# Patient Record
Sex: MALE | Race: WHITE | Employment: OTHER | URBAN - NONMETROPOLITAN AREA
[De-identification: names, ages, dates, MRNs, and addresses within clinical notes are randomized per-mention and may not be internally consistent; named-entity substitution may affect disease eponyms.]

---

## 2021-06-11 ENCOUNTER — TELEPHONE (OUTPATIENT)
Dept: FAMILY MEDICINE CLINIC | Age: 71
End: 2021-06-11

## 2021-06-11 NOTE — TELEPHONE ENCOUNTER
Please refer to note sent in pt's wife chart requesting orders for covid antibody and covid test for upcoming flight

## 2021-06-14 NOTE — TELEPHONE ENCOUNTER
I am sorry as much as I would like to help I have not actually seen the patient and because of this do not have an established relationship. I am I am unable to order a test for patient I have not seen yet. There are tests that are available at various pharmacies throughout the area and I would recommend that they check there. Also the turnaround time for the test of the local pharmacies is likely to be more consistent with their needs further travel.

## 2021-07-16 ENCOUNTER — OFFICE VISIT (OUTPATIENT)
Dept: FAMILY MEDICINE CLINIC | Age: 71
End: 2021-07-16
Payer: COMMERCIAL

## 2021-07-16 VITALS
WEIGHT: 159 LBS | HEART RATE: 59 BPM | SYSTOLIC BLOOD PRESSURE: 110 MMHG | HEIGHT: 70 IN | OXYGEN SATURATION: 98 % | RESPIRATION RATE: 20 BRPM | BODY MASS INDEX: 22.76 KG/M2 | DIASTOLIC BLOOD PRESSURE: 82 MMHG

## 2021-07-16 DIAGNOSIS — Z51.81 MEDICATION MONITORING ENCOUNTER: ICD-10-CM

## 2021-07-16 DIAGNOSIS — Z13.220 SCREENING, LIPID: ICD-10-CM

## 2021-07-16 DIAGNOSIS — Z11.4 ENCOUNTER FOR SCREENING FOR HIV: ICD-10-CM

## 2021-07-16 DIAGNOSIS — Z13.1 SCREENING FOR DIABETES MELLITUS: ICD-10-CM

## 2021-07-16 DIAGNOSIS — R25.2 LEG CRAMP: ICD-10-CM

## 2021-07-16 DIAGNOSIS — Z23 NEED FOR DIPHTHERIA-TETANUS-PERTUSSIS (TDAP) VACCINE: ICD-10-CM

## 2021-07-16 DIAGNOSIS — Z12.11 SCREEN FOR COLON CANCER: ICD-10-CM

## 2021-07-16 DIAGNOSIS — Z00.00 WELL ADULT EXAM: Primary | ICD-10-CM

## 2021-07-16 DIAGNOSIS — Z11.59 NEED FOR HEPATITIS C SCREENING TEST: ICD-10-CM

## 2021-07-16 DIAGNOSIS — Z12.5 SCREENING FOR MALIGNANT NEOPLASM OF PROSTATE: ICD-10-CM

## 2021-07-16 PROBLEM — E78.00 PURE HYPERCHOLESTEROLEMIA: Status: ACTIVE | Noted: 2018-01-24

## 2021-07-16 PROCEDURE — 99387 INIT PM E/M NEW PAT 65+ YRS: CPT | Performed by: FAMILY MEDICINE

## 2021-07-16 PROCEDURE — 90715 TDAP VACCINE 7 YRS/> IM: CPT | Performed by: FAMILY MEDICINE

## 2021-07-16 PROCEDURE — 90471 IMMUNIZATION ADMIN: CPT | Performed by: FAMILY MEDICINE

## 2021-07-16 RX ORDER — GINGER ROOT
POWDER (GRAM) MISCELLANEOUS
COMMUNITY

## 2021-07-16 RX ORDER — ALUMINUM HYDROXIDE, MAGNESIUM HYDROXIDE, DIMETHICONE 400; 400; 40 MG/5ML; MG/5ML; MG/5ML
1 LIQUID ORAL
COMMUNITY

## 2021-07-16 RX ORDER — MULTIVITAMIN WITH IRON
TABLET ORAL
COMMUNITY
End: 2022-02-18

## 2021-07-16 RX ORDER — CHLORAL HYDRATE 500 MG
3000 CAPSULE ORAL 3 TIMES DAILY
COMMUNITY

## 2021-07-16 SDOH — ECONOMIC STABILITY: FOOD INSECURITY: WITHIN THE PAST 12 MONTHS, THE FOOD YOU BOUGHT JUST DIDN'T LAST AND YOU DIDN'T HAVE MONEY TO GET MORE.: NEVER TRUE

## 2021-07-16 SDOH — ECONOMIC STABILITY: FOOD INSECURITY: WITHIN THE PAST 12 MONTHS, YOU WORRIED THAT YOUR FOOD WOULD RUN OUT BEFORE YOU GOT MONEY TO BUY MORE.: NEVER TRUE

## 2021-07-16 ASSESSMENT — PATIENT HEALTH QUESTIONNAIRE - PHQ9
SUM OF ALL RESPONSES TO PHQ QUESTIONS 1-9: 0
SUM OF ALL RESPONSES TO PHQ9 QUESTIONS 1 & 2: 0
2. FEELING DOWN, DEPRESSED OR HOPELESS: 0
1. LITTLE INTEREST OR PLEASURE IN DOING THINGS: 0

## 2021-07-16 ASSESSMENT — SOCIAL DETERMINANTS OF HEALTH (SDOH): HOW HARD IS IT FOR YOU TO PAY FOR THE VERY BASICS LIKE FOOD, HOUSING, MEDICAL CARE, AND HEATING?: NOT HARD AT ALL

## 2021-07-16 NOTE — PROGRESS NOTES
[address] [facility]  [address]  Dept: [phone]  Dept X:[phone]    Riaz Crawley is a 72 y.o. male who presents today for his medical conditions/complaints as notedbelow. 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  Does try to keep his weight down has tried multiple different types of diets in the past.  Usually successful. No significant lab abnormalities in the past although cholesterol does improve when he is on a better diet. No real significant past medical history. Does have some occasional leg cramps. Wakes him up out of the bed on a regular basis but not nightly.       BP Readings from Last 3 Encounters:   07/16/21 110/82          (goal 120/80)    Wt Readings from Last 3 Encounters:   07/16/21 159 lb (72.1 kg)        Past Medical History:   Diagnosis Date    Non-recurrent unilateral inguinal hernia without obstruction or gangrene       Past Surgical History:   Procedure Laterality Date    INGUINAL HERNIA REPAIR         Family History   Problem Relation Age of Onset    Cancer Mother         Leukemia- 26    Stroke Father 79        tobacco    No Known Problems Son     No Known Problems Daughter        Social History     Tobacco Use    Smoking status: Never Smoker    Smokeless tobacco: Never Used   Substance Use Topics    Alcohol use: Never      Current Outpatient Medications   Medication Sig Dispense Refill    Lactobacillus Rhamnosus, GG, (RA PROBIOTIC DIGESTIVE CARE) CAPS Take 1 capsule by mouth      Cholecalciferol 50 MCG (2000 UT) TABS Take 2,000 Units by mouth      GLUCOSAMINE-CHONDROIT-BIOFL-MN PO Take by mouth      Omega-3 Fatty Acids (FISH OIL) 1000 MG CAPS Take 3,000 mg by mouth 3 times daily      Vitamin A 2400 MCG (8000 UT) CAPS Take by mouth      Turmeric (QC TUMERIC COMPLEX PO) Take by mouth      Padmini Root POWD by Does not apply route       No current facility-administered medications for this visit. No Known Allergies    Health Maintenance   Topic Date Due    Hepatitis C screen  Never done    COVID-19 Vaccine (1) Never done    Colon cancer screen colonoscopy  Never done    Shingles Vaccine (1 of 2) Never done    Pneumococcal 65+ years Vaccine (1 of 1 - PPSV23) Never done    Lipid screen  01/19/2021    Flu vaccine (1) 09/01/2021    DTaP/Tdap/Td vaccine (3 - Td or Tdap) 07/16/2031    Hepatitis A vaccine  Aged Out    Hepatitis B vaccine  Aged Out    Hib vaccine  Aged Out    Meningococcal (ACWY) vaccine  Aged Out       Subjective:      Review of Systems    Objective:     /82 (Site: Left Upper Arm, Position: Sitting, Cuff Size: Large Adult)   Pulse 59   Resp 20   Ht 5' 10\" (1.778 m)   Wt 159 lb (72.1 kg)   SpO2 98%   BMI 22.81 kg/m²     Physical Exam  Vitals and nursing note reviewed. Constitutional:       Appearance: Normal appearance. He is well-developed. HENT:      Head: Normocephalic and atraumatic. Eyes:      Conjunctiva/sclera: Conjunctivae normal.   Neck:      Thyroid: No thyromegaly. Vascular: No JVD. Cardiovascular:      Rate and Rhythm: Normal rate and regular rhythm. Pulses: Normal pulses. Heart sounds: Normal heart sounds. No murmur heard. No friction rub. No gallop. Pulmonary:      Effort: Pulmonary effort is normal. No respiratory distress. Breath sounds: Normal breath sounds. Abdominal:      Palpations: Abdomen is soft. There is no mass. Tenderness: There is no abdominal tenderness. Musculoskeletal:      Cervical back: Normal range of motion and neck supple. Lymphadenopathy:      Cervical: No cervical adenopathy. Skin:     General: Skin is warm. Capillary Refill: Capillary refill takes less than 2 seconds. Neurological:      General: No focal deficit present. Mental Status: He is alert and oriented to person, place, and time. note that portions of this note were completed with a voice-recognition program. Efforts were made to edit the dictation but occasionally words are mis-transcribed.)    Electronically signed by Cristy Collins MD on 7/18/2021

## 2021-07-23 LAB
ALBUMIN/GLOBULIN RATIO: 1.55 G/DL
ALBUMIN: 4.5 G/DL (ref 3.5–5)
ALP BLD-CCNC: 61 UNITS/L (ref 38–126)
ALT SERPL-CCNC: 52 UNITS/L (ref 4–35)
ANION GAP SERPL CALCULATED.3IONS-SCNC: 11.5 MMOL/L
AST SERPL-CCNC: 135 UNITS/L (ref 14–36)
BASOPHILS %: 1.64 (ref 0–3)
BASOPHILS ABSOLUTE: 0.09 (ref 0–0.3)
BILIRUB SERPL-MCNC: 0.9 MG/DL (ref 0.2–1.3)
BUN BLDV-MCNC: 20 MG/DL (ref 7–17)
CALCIUM SERPL-MCNC: 9.6 MG/DL (ref 8.4–10.2)
CHLORIDE BLD-SCNC: 105 MMOL/L (ref 98–120)
CHOLESTEROL/HDL RATIO: 2.86 RATIO (ref 0–4.5)
CHOLESTEROL: 189 MG/DL (ref 50–200)
CO2: 28 MMOL/L (ref 22–31)
CREAT SERPL-MCNC: 0.9 MG/DL (ref 0.5–1)
DIAGNOSTIC PSA: 0.93 NG/ML
EOSINOPHILS %: 0.97 (ref 0–10)
EOSINOPHILS ABSOLUTE: 0.06 (ref 0–1.1)
FERRITIN: 84.7 NG/DL (ref 10–282)
GFR CALCULATED: > 60
GLOBULIN: 2.9 G/DL
GLUCOSE: 86 MG/DL (ref 65–105)
HCT VFR BLD CALC: 43.6 % (ref 37–47)
HDLC SERPL-MCNC: 66 MG/DL (ref 36–68)
HEMOGLOBIN: 14.6 (ref 12–16)
HEPATITIS C ANTIBODY: NONREACTIVE
HIV AG/AB: NONREACTIVE
LDL CHOLESTEROL CALCULATED: 110.2 MG/DL (ref 0–160)
LYMPHOCYTE %: 22.05 (ref 20–51.1)
LYMPHOCYTES ABSOLUTE: 1.26 (ref 1–5.5)
MAGNESIUM: 2.1 MG/DL (ref 1.6–2.3)
MCH RBC QN AUTO: 30.9 PG (ref 28.5–32.5)
MCHC RBC AUTO-ENTMCNC: 33.4 G/DL (ref 32–37)
MCV RBC AUTO: 92.6 FL (ref 80–94)
MONOCYTES %: 9.94 (ref 1.7–9.3)
MONOCYTES ABSOLUTE: 0.57 (ref 0.1–1)
NEUTROPHILS %: 65.4 (ref 42.2–75.2)
NEUTROPHILS ABSOLUTE: 3.73 (ref 2–8.1)
PDW BLD-RTO: 11.6 % (ref 8.5–15.5)
PLATELET # BLD: 291.1 THOU/MM3 (ref 130–400)
POTASSIUM SERPL-SCNC: 4.5 MMOL/L (ref 3.6–5)
RBC: 4.71 M/UL (ref 4.2–5.4)
SODIUM BLD-SCNC: 140 MMOL/L (ref 135–145)
TOTAL PROTEIN, SERUM: 7.4 G/DL (ref 6.3–8.2)
TRIGL SERPL-MCNC: 64 MG/DL (ref 10–250)
VLDLC SERPL CALC-MCNC: 12.8 MG/DL (ref 0–50)
WBC: 5.7 THOU/ML3 (ref 4.8–10.8)

## 2021-07-26 DIAGNOSIS — R79.89 ABNORMAL LFTS: Primary | ICD-10-CM

## 2021-07-26 NOTE — RESULT ENCOUNTER NOTE
Please let patient know his labs were all fine with the exception of his liver tests being elevated. Many causes are possible of this. Vit A can be toxic to the liver -- please stop. Ziggy has mixed evidence, some show protection and other damage. I would recommend also holding this at this time. May continue the matthias root, lactobacillus, Vit D, omega 3 and glucosamine/ chondroitin but in no more than usual recommended doses. I would like a recheck on these levels in 2 months with the liver function tests. Orders put in the chart. Nonfasting.

## 2021-07-28 DIAGNOSIS — Z12.11 SCREEN FOR COLON CANCER: ICD-10-CM

## 2021-08-09 ENCOUNTER — TELEPHONE (OUTPATIENT)
Dept: FAMILY MEDICINE CLINIC | Age: 71
End: 2021-08-09

## 2021-08-09 NOTE — TELEPHONE ENCOUNTER
Patient returned call. Believed it was about cologuard results. Writer gave him the negative result. Patient is also wondering when Souleymane would like his blood work checked again. Patient stated he had some liver issues due to taking too much vitamin A and believes that Souleymane wanted it checked again.

## 2022-02-18 ENCOUNTER — OFFICE VISIT (OUTPATIENT)
Dept: FAMILY MEDICINE CLINIC | Age: 72
End: 2022-02-18
Payer: COMMERCIAL

## 2022-02-18 VITALS
DIASTOLIC BLOOD PRESSURE: 78 MMHG | WEIGHT: 168 LBS | HEART RATE: 66 BPM | BODY MASS INDEX: 24.11 KG/M2 | SYSTOLIC BLOOD PRESSURE: 122 MMHG

## 2022-02-18 DIAGNOSIS — Z12.5 SCREENING FOR PROSTATE CANCER: ICD-10-CM

## 2022-02-18 DIAGNOSIS — Z13.1 SCREENING FOR DIABETES MELLITUS: ICD-10-CM

## 2022-02-18 DIAGNOSIS — Z00.00 WELL ADULT EXAM: Primary | ICD-10-CM

## 2022-02-18 DIAGNOSIS — R79.89 ABNORMAL LFTS: ICD-10-CM

## 2022-02-18 DIAGNOSIS — Z13.220 SCREENING, LIPID: ICD-10-CM

## 2022-02-18 LAB
ALBUMIN/GLOBULIN RATIO: 1.4 G/DL
ALBUMIN: 4.2 G/DL (ref 3.5–5)
ALP BLD-CCNC: 54 UNITS/L (ref 38–126)
ALT SERPL-CCNC: 15 UNITS/L (ref 4–50)
ANION GAP SERPL CALCULATED.3IONS-SCNC: 6.9 MMOL/L
AST SERPL-CCNC: 27 UNITS/L (ref 17–59)
BASOPHILS %: 1.79 (ref 0–3)
BASOPHILS ABSOLUTE: 0.08 (ref 0–0.3)
BILIRUB SERPL-MCNC: 0.8 MG/DL (ref 0.2–1.3)
BUN BLDV-MCNC: 12 MG/DL (ref 9–20)
CALCIUM SERPL-MCNC: 9 MG/DL (ref 8.4–10.2)
CHLORIDE BLD-SCNC: 105 MMOL/L (ref 98–120)
CHOLESTEROL/HDL RATIO: 3 RATIO (ref 0–4.5)
CHOLESTEROL: 207 MG/DL (ref 50–200)
CO2: 25 MMOL/L (ref 22–31)
CREAT SERPL-MCNC: 0.7 MG/DL (ref 0.7–1.3)
DIAGNOSTIC PSA: 1.53 NG/ML (ref 0–4)
EOSINOPHILS %: 0.92 (ref 0–10)
EOSINOPHILS ABSOLUTE: 0.04 (ref 0–1.1)
GFR CALCULATED: > 60
GLOBULIN: 3 G/DL
GLUCOSE: 88 MG/DL (ref 75–110)
HCT VFR BLD CALC: 45.5 % (ref 42–52)
HDLC SERPL-MCNC: 69 MG/DL (ref 36–68)
HEMOGLOBIN: 15.2 (ref 13.8–17.8)
LDL CHOLESTEROL CALCULATED: 121.4 MG/DL (ref 0–160)
LYMPHOCYTE %: 32.28 (ref 20–51.1)
LYMPHOCYTES ABSOLUTE: 1.5 (ref 1–5.5)
MCH RBC QN AUTO: 31.5 PG (ref 28.5–32.5)
MCHC RBC AUTO-ENTMCNC: 33.4 G/DL (ref 32–37)
MCV RBC AUTO: 94.5 FL (ref 80–94)
MONOCYTES %: 9.86 (ref 1.7–9.3)
MONOCYTES ABSOLUTE: 0.46 (ref 0.1–1)
NEUTROPHILS %: 55.16 (ref 42.2–75.2)
NEUTROPHILS ABSOLUTE: 2.56 (ref 2–8.1)
PDW BLD-RTO: 11.9 % (ref 10–15.5)
PLATELET # BLD: 283.6 THOU/MM3 (ref 130–400)
POTASSIUM SERPL-SCNC: 4.5 MMOL/L (ref 3.6–5)
RBC: 4.82 M/UL (ref 4.7–6.1)
SODIUM BLD-SCNC: 137 MMOL/L (ref 135–145)
TOTAL PROTEIN, SERUM: 7.2 G/DL (ref 6.3–8.2)
TRIGL SERPL-MCNC: 83 MG/DL (ref 10–250)
VLDLC SERPL CALC-MCNC: 17 MG/DL (ref 0–50)
WBC: 4.6 THOU/ML3 (ref 4.8–10.8)

## 2022-02-18 PROCEDURE — 99397 PER PM REEVAL EST PAT 65+ YR: CPT | Performed by: FAMILY MEDICINE

## 2022-02-18 ASSESSMENT — PATIENT HEALTH QUESTIONNAIRE - PHQ9
SUM OF ALL RESPONSES TO PHQ9 QUESTIONS 1 & 2: 0
SUM OF ALL RESPONSES TO PHQ QUESTIONS 1-9: 0
2. FEELING DOWN, DEPRESSED OR HOPELESS: 0
SUM OF ALL RESPONSES TO PHQ QUESTIONS 1-9: 0
SUM OF ALL RESPONSES TO PHQ QUESTIONS 1-9: 0
1. LITTLE INTEREST OR PLEASURE IN DOING THINGS: 0
SUM OF ALL RESPONSES TO PHQ QUESTIONS 1-9: 0

## 2022-02-18 NOTE — PROGRESS NOTES
1200 [facility]  [address]  Dept: [phone]  Dept VRU:[phone]    Tawnya Melara is a 80 y.o. male who presents today for his medical conditions/complaints as notedbelow. Tawnya Melara is c/o of Annual Exam      HPI:     HPI    Is staying active and still working,  Feels good. Did stop the Vit A and still has the other supplements. Had elevated LFT's and concern this was related to his supplement use. Has been screening for the dental and the eye exams regularly. BP Readings from Last 3 Encounters:   02/18/22 122/78   07/16/21 110/82          (goal 120/80)    Wt Readings from Last 3 Encounters:   02/18/22 168 lb (76.2 kg)   07/16/21 159 lb (72.1 kg)        Past Medical History:   Diagnosis Date    Non-recurrent unilateral inguinal hernia without obstruction or gangrene       Past Surgical History:   Procedure Laterality Date    INGUINAL HERNIA REPAIR         Family History   Problem Relation Age of Onset    Cancer Mother         Leukemia- 26    Stroke Father 79        tobacco    No Known Problems Son     No Known Problems Daughter        Social History     Tobacco Use    Smoking status: Never Smoker    Smokeless tobacco: Never Used   Substance Use Topics    Alcohol use: Never      Current Outpatient Medications   Medication Sig Dispense Refill    Lactobacillus Rhamnosus, GG, (RA PROBIOTIC DIGESTIVE CARE) CAPS Take 1 capsule by mouth      Cholecalciferol 50 MCG (2000 UT) TABS Take 2,000 Units by mouth      GLUCOSAMINE-CHONDROIT-BIOFL-MN PO Take by mouth      Omega-3 Fatty Acids (FISH OIL) 1000 MG CAPS Take 3,000 mg by mouth 3 times daily      Turmeric (QC TUMERIC COMPLEX PO) Take by mouth      Padmini Root POWD by Does not apply route       No current facility-administered medications for this visit.      No Known Allergies    Health Maintenance   Topic Date Due    COVID-19 Vaccine (1) Never done    Shingles Vaccine (1 of 2) Never done    Pneumococcal 65+ years Vaccine (1 of 1 - PPSV23) Never done    Flu vaccine (1) Never done    Depression Screen  02/18/2023    Colorectal Cancer Screen  07/22/2024    Lipid screen  02/18/2027    DTaP/Tdap/Td vaccine (3 - Td or Tdap) 07/16/2031    Hepatitis C screen  Completed    Hepatitis A vaccine  Aged Out    Hepatitis B vaccine  Aged Out    Hib vaccine  Aged Out    Meningococcal (ACWY) vaccine  Aged Out       Subjective:      Review of Systems    Objective:     /78 (Site: Left Upper Arm, Position: Sitting, Cuff Size: Medium Adult)   Pulse 66   Wt 168 lb (76.2 kg)   BMI 24.11 kg/m²     Physical Exam  Vitals and nursing note reviewed. Constitutional:       Appearance: Normal appearance. He is well-developed. HENT:      Head: Normocephalic and atraumatic. Mouth/Throat:      Mouth: Mucous membranes are moist.   Eyes:      Extraocular Movements: Extraocular movements intact. Conjunctiva/sclera: Conjunctivae normal.      Pupils: Pupils are equal, round, and reactive to light. Neck:      Thyroid: No thyromegaly. Vascular: No JVD. Cardiovascular:      Rate and Rhythm: Normal rate and regular rhythm. Heart sounds: Normal heart sounds. No murmur heard. No friction rub. No gallop. Pulmonary:      Effort: Pulmonary effort is normal. No respiratory distress. Breath sounds: Normal breath sounds. Abdominal:      Palpations: Abdomen is soft. There is no mass. Tenderness: There is no abdominal tenderness. Musculoskeletal:      Cervical back: Normal range of motion and neck supple. Right lower leg: No edema. Left lower leg: No edema. Lymphadenopathy:      Cervical: No cervical adenopathy. Skin:     General: Skin is warm. Neurological:      General: No focal deficit present. Mental Status: He is alert and oriented to person, place, and time.    Psychiatric:         Mood and Affect: Mood normal.         Behavior: Behavior normal.         Thought Content: Thought content normal.         Judgment: Judgment normal.         Assessment/Plan:     1. Well adult exam  2. Abnormal LFTs  -     Vitamin A; Future  3. Screening, lipid  4. Screening for diabetes mellitus  5. Screening for prostate cancer    Up-to-date for routine screening. Reviewed recommended immunizations based on age and risk factors which he declines at this time. Questions answered. Lab Results   Component Value Date    WBC 4.6 (L) 02/18/2022    HGB 15.2 02/18/2022    HCT 45.5 02/18/2022    .6 02/18/2022    CHOL 207 (H) 02/18/2022    TRIG 83 02/18/2022    HDL 69 (H) 02/18/2022    ALT 15 02/18/2022    AST 27 02/18/2022     02/18/2022    K 4.5 02/18/2022     02/18/2022    CREATININE 0.7 02/18/2022    BUN 12 02/18/2022    CO2 25 02/18/2022    PSA 0.55 01/19/2016       No follow-ups on file. Multiple labs and other testing may have been ordered which may not be completely evident from the above note due to system interface incompatibilities. Patient given educational materials - see patientinstructions. Discussed use, benefit, and side effects of prescribed medications. All patient questions answered. Pt voiced understanding. Reviewed health maintenance. Instructed to continue current medications, diet andexercise. Patient agreed with treatment plan. Follow up as directed.      (Please note that portions of this note were completed with a voice-recognition program. Efforts were made to edit the dictation but occasionally words are mis-transcribed.)    Electronically signed by Lizzette Delgado MD on 2/27/2022

## 2022-02-21 LAB
INTERPRETATION: NORMAL
RETINYL PALMITATE: <0.02
VITAMIN A: 0.52

## 2022-02-22 DIAGNOSIS — R79.89 ABNORMAL LFTS: ICD-10-CM

## 2022-07-05 ENCOUNTER — TELEPHONE (OUTPATIENT)
Dept: FAMILY MEDICINE CLINIC | Age: 72
End: 2022-07-05

## 2022-07-05 NOTE — TELEPHONE ENCOUNTER
Received call from [address] triage. Wife calling with concerns that  has been feeling fatigue for the past 8 weeks. Looking to do blood or an appointment on a Friday.

## 2022-07-05 NOTE — TELEPHONE ENCOUNTER
Spoke with wife, scheduled for next available Friday per patient request and let them know if need seen sooner to call

## 2022-07-22 ENCOUNTER — OFFICE VISIT (OUTPATIENT)
Dept: FAMILY MEDICINE CLINIC | Age: 72
End: 2022-07-22
Payer: COMMERCIAL

## 2022-07-22 VITALS
OXYGEN SATURATION: 97 % | HEART RATE: 68 BPM | BODY MASS INDEX: 21.52 KG/M2 | WEIGHT: 150 LBS | DIASTOLIC BLOOD PRESSURE: 64 MMHG | RESPIRATION RATE: 16 BRPM | SYSTOLIC BLOOD PRESSURE: 102 MMHG

## 2022-07-22 DIAGNOSIS — E78.00 PURE HYPERCHOLESTEROLEMIA: ICD-10-CM

## 2022-07-22 DIAGNOSIS — R53.82 CHRONIC FATIGUE: Primary | ICD-10-CM

## 2022-07-22 PROCEDURE — 99213 OFFICE O/P EST LOW 20 MIN: CPT | Performed by: FAMILY MEDICINE

## 2022-07-22 PROCEDURE — 1123F ACP DISCUSS/DSCN MKR DOCD: CPT | Performed by: FAMILY MEDICINE

## 2022-07-22 SDOH — ECONOMIC STABILITY: FOOD INSECURITY: WITHIN THE PAST 12 MONTHS, YOU WORRIED THAT YOUR FOOD WOULD RUN OUT BEFORE YOU GOT MONEY TO BUY MORE.: NEVER TRUE

## 2022-07-22 SDOH — ECONOMIC STABILITY: FOOD INSECURITY: WITHIN THE PAST 12 MONTHS, THE FOOD YOU BOUGHT JUST DIDN'T LAST AND YOU DIDN'T HAVE MONEY TO GET MORE.: NEVER TRUE

## 2022-07-22 ASSESSMENT — SOCIAL DETERMINANTS OF HEALTH (SDOH): HOW HARD IS IT FOR YOU TO PAY FOR THE VERY BASICS LIKE FOOD, HOUSING, MEDICAL CARE, AND HEATING?: NOT HARD AT ALL

## 2022-07-22 NOTE — PROGRESS NOTES
1200 [facility]  [address]  Dept: [phone]  Dept DQQ:[phone]    Leonarda Redmond is a 80 y.o. male who presents today for his medical conditions/complaints as notedbelow. Leonarda Redmond is c/o of Fatigue (Is also wanting to see what his test levels are )        Assessment/Plan:     1. Chronic fatigue  -     Testosterone; Future  2. Pure hypercholesterolemia    Discussed the pitfalls of testosterone supplementation. Did discuss we could test this but unless severely out of normal range would not recommend any supplementation. Will research into the other health questions that he had today and get back with him. In the interim recommended adequate nutrition and avoid excessive weight loss and continue regular exercise. Current supplements appear to be safe. No evidence of any other underlying health issues other than age-related changes. Lab Results   Component Value Date    WBC 4.6 (L) 02/18/2022    HGB 15.2 02/18/2022    HCT 45.5 02/18/2022    .6 02/18/2022    CHOL 207 (H) 02/18/2022    TRIG 83 02/18/2022    HDL 69 (H) 02/18/2022    ALT 15 02/18/2022    AST 27 02/18/2022     02/18/2022    K 4.5 02/18/2022     02/18/2022    CREATININE 0.7 02/18/2022    BUN 12 02/18/2022    CO2 25 02/18/2022    PSA 0.55 01/19/2016       Return in about 6 months (around 1/22/2023) for Well adult. Subjective:      HPI:     HPI    Lora Real has lost about 18 pounds. He has been exercising and trying to really eat healthfully. He does continue to use of glucosamine/chondroitin supplements the Lactobacillus omega-3's and turmeric. Also the vitamin D. And gingerroot. Just feels older at times. Not the get up and go. Walks quickly, wants to nap. If he gets a chance. If he sits he will sleep. Sleeps well at night. He denies chest pain shortness of breath or dyspnea. Denies heat or cold intolerance.   He has no other focal symptoms. No URI symptoms no changes in his bowels. He is up-to-date on all his routine health maintenance and surveillance. Has questions about several tests that can help him determine what is best foods for him to be eating. These are recommended by a food and nutrition group from [employer]. He provided the names today. Wonders if he can have these ordered in addition to the testosterone level to help him improve his health and to evaluate his fatigue. BP Readings from Last 3 Encounters:   07/22/22 102/64   02/18/22 122/78   07/16/21 110/82          (goal 120/80)    Wt Readings from Last 3 Encounters:   07/22/22 150 lb (68 kg)   02/18/22 168 lb (76.2 kg)   07/16/21 159 lb (72.1 kg)        Past Medical History:   Diagnosis Date    Non-recurrent unilateral inguinal hernia without obstruction or gangrene       Past Surgical History:   Procedure Laterality Date    INGUINAL HERNIA REPAIR         Family History   Problem Relation Age of Onset    Cancer Mother         Leukemia- 1968    Stroke Father 79        tobacco    No Known Problems Son     No Known Problems Daughter        Social History     Tobacco Use    Smoking status: Never    Smokeless tobacco: Never   Substance Use Topics    Alcohol use: Never      Current Outpatient Medications   Medication Sig Dispense Refill    Lactobacillus Rhamnosus, GG, (RA PROBIOTIC DIGESTIVE CARE) CAPS Take 1 capsule by mouth      Cholecalciferol 50 MCG (2000 UT) TABS Take 2,000 Units by mouth      GLUCOSAMINE-CHONDROIT-BIOFL-MN PO Take by mouth      Omega-3 Fatty Acids (FISH OIL) 1000 MG CAPS Take 3,000 mg by mouth 3 times daily      Turmeric (QC TUMERIC COMPLEX PO) Take by mouth      Padmini Root POWD by Does not apply route       No current facility-administered medications for this visit.      No Known Allergies    Health Maintenance   Topic Date Due    COVID-19 Vaccine (1) Never done    Shingles vaccine (1 of 2) Never done    Pneumococcal 65+ years Vaccine (1 - PCV) Never done    Flu vaccine (1) 09/01/2022    Depression Screen  02/18/2023    Colorectal Cancer Screen  07/22/2024    Lipids  02/18/2027    DTaP/Tdap/Td vaccine (3 - Td or Tdap) 07/16/2031    Hepatitis C screen  Completed    Hepatitis A vaccine  Aged Out    Hepatitis B vaccine  Aged Out    Hib vaccine  Aged Out    Meningococcal (ACWY) vaccine  Aged Out         Review of Systems    Objective:     /64 (Site: Left Upper Arm, Position: Sitting, Cuff Size: Medium Adult)   Pulse 68   Resp 16   Wt 150 lb (68 kg)   SpO2 97%   BMI 21.52 kg/m²     Physical Exam  Vitals and nursing note reviewed. Constitutional:       Appearance: Normal appearance. He is well-developed. HENT:      Head: Normocephalic and atraumatic. Right Ear: External ear normal.      Left Ear: External ear normal.   Eyes:      Extraocular Movements: Extraocular movements intact. Conjunctiva/sclera: Conjunctivae normal.   Neck:      Thyroid: No thyromegaly. Vascular: No JVD. Cardiovascular:      Rate and Rhythm: Normal rate and regular rhythm. Pulses: Normal pulses. Heart sounds: Normal heart sounds. No murmur heard. No friction rub. No gallop. Pulmonary:      Effort: Pulmonary effort is normal. No respiratory distress. Breath sounds: Normal breath sounds. Musculoskeletal:      Cervical back: Normal range of motion and neck supple. Right lower leg: No edema. Left lower leg: No edema. Lymphadenopathy:      Cervical: No cervical adenopathy. Skin:     General: Skin is warm. Capillary Refill: Capillary refill takes less than 2 seconds. Neurological:      General: No focal deficit present. Mental Status: He is alert and oriented to person, place, and time. Psychiatric:         Mood and Affect: Mood normal.         Behavior: Behavior normal.         Thought Content:  Thought content normal.         Judgment: Judgment normal.             Multiple labs and other testing may have been

## 2023-01-20 ENCOUNTER — OFFICE VISIT (OUTPATIENT)
Dept: FAMILY MEDICINE CLINIC | Age: 73
End: 2023-01-20
Payer: COMMERCIAL

## 2023-01-20 VITALS
OXYGEN SATURATION: 96 % | HEART RATE: 73 BPM | WEIGHT: 159.8 LBS | DIASTOLIC BLOOD PRESSURE: 52 MMHG | SYSTOLIC BLOOD PRESSURE: 120 MMHG | HEIGHT: 69 IN | BODY MASS INDEX: 23.67 KG/M2

## 2023-01-20 DIAGNOSIS — Z12.5 SCREENING FOR MALIGNANT NEOPLASM OF PROSTATE: ICD-10-CM

## 2023-01-20 DIAGNOSIS — Z00.00 WELL ADULT EXAM: Primary | ICD-10-CM

## 2023-01-20 DIAGNOSIS — E78.00 PURE HYPERCHOLESTEROLEMIA: ICD-10-CM

## 2023-01-20 DIAGNOSIS — R79.89 ABNORMAL LFTS: ICD-10-CM

## 2023-01-20 PROBLEM — B35.1 TINEA UNGUIUM: Status: ACTIVE | Noted: 2022-11-30

## 2023-01-20 PROCEDURE — 99397 PER PM REEVAL EST PAT 65+ YR: CPT | Performed by: FAMILY MEDICINE

## 2023-01-20 ASSESSMENT — PATIENT HEALTH QUESTIONNAIRE - PHQ9
2. FEELING DOWN, DEPRESSED OR HOPELESS: 0
SUM OF ALL RESPONSES TO PHQ QUESTIONS 1-9: 0
1. LITTLE INTEREST OR PLEASURE IN DOING THINGS: 0
SUM OF ALL RESPONSES TO PHQ QUESTIONS 1-9: 0
SUM OF ALL RESPONSES TO PHQ QUESTIONS 1-9: 0
SUM OF ALL RESPONSES TO PHQ9 QUESTIONS 1 & 2: 0
SUM OF ALL RESPONSES TO PHQ QUESTIONS 1-9: 0

## 2023-01-20 NOTE — PROGRESS NOTES
1200 [facility]  [address]  Dept: [phone]  Dept WPW:[phone]    Catrachita Carreno is a 71 y.o. male who presents today for his medical conditions/complaints as notedbelow. Catrachita Carreno is c/o of Annual Exam (Reports saw podiatrist in [place] for toenail fungus and has follow up in a couple months )      Assessment/Plan:     1. Well adult exam  -     Comprehensive Metabolic Panel; Future  -     PSA, Diagnostic; Future  -     Lipid Panel; Future  2. Pure hypercholesterolemia  -     Lipid Panel; Future  3. Abnormal LFTs  -     Comprehensive Metabolic Panel; Future  4. Screening for malignant neoplasm of prostate  -     PSA, Diagnostic; Future    Abnormal LFT's have resolved with cessation of the excessive vitamins. Continue to monitor Annually. Reviewed well adult topics and encouraged recommended vaccines. Cologuard due in 2024      Lab Results   Component Value Date    WBC 4.6 (L) 02/18/2022    HGB 15.2 02/18/2022    HCT 45.5 02/18/2022    .6 02/18/2022    CHOL 207 (H) 02/18/2022    TRIG 83 02/18/2022    HDL 69 (H) 02/18/2022    ALT 19 11/30/2022    AST 26 11/30/2022     02/18/2022    K 4.5 02/18/2022     02/18/2022    CREATININE 0.7 02/18/2022    BUN 12 02/18/2022    CO2 25 02/18/2022    PSA 0.55 01/19/2016       Return in about 1 year (around 1/20/2024) for Well adult. Subjective:      HPI:     HPI    Has been feeling well. No concerns. Continues to work. Exercises regularly. No other concerns. Trying to eat regularly.     BP Readings from Last 3 Encounters:   01/20/23 (!) 120/52   07/22/22 102/64   02/18/22 122/78          (goal 120/80)    Wt Readings from Last 3 Encounters:   01/20/23 159 lb 12.8 oz (72.5 kg)   07/22/22 150 lb (68 kg)   02/18/22 168 lb (76.2 kg)        Past Medical History:   Diagnosis Date    Non-recurrent unilateral inguinal hernia without obstruction or gangrene       Past Surgical History:   Procedure Laterality Date    INGUINAL HERNIA REPAIR         Family History   Problem Relation Age of Onset    Cancer Mother         Leukemia- 1968    Stroke Father 79        tobacco    No Known Problems Son     No Known Problems Daughter        Social History     Tobacco Use    Smoking status: Never    Smokeless tobacco: Never   Substance Use Topics    Alcohol use: Never      Current Outpatient Medications   Medication Sig Dispense Refill    Lactobacillus Rhamnosus, GG, (RA PROBIOTIC DIGESTIVE CARE) CAPS Take 1 capsule by mouth      Cholecalciferol 50 MCG (2000 UT) TABS Take 2,000 Units by mouth      GLUCOSAMINE-CHONDROIT-BIOFL-MN PO Take by mouth      Omega-3 Fatty Acids (FISH OIL) 1000 MG CAPS Take 3,000 mg by mouth 3 times daily      Turmeric (QC TUMERIC COMPLEX PO) Take by mouth      Padmini Root POWD by Does not apply route       No current facility-administered medications for this visit. No Known Allergies    Health Maintenance   Topic Date Due    COVID-19 Vaccine (1) Never done    Shingles vaccine (1 of 2) Never done    Pneumococcal 65+ years Vaccine (1 - PCV) Never done    Flu vaccine (1) Never done    Depression Screen  01/20/2024    Colorectal Cancer Screen  07/22/2024    Lipids  02/18/2027    DTaP/Tdap/Td vaccine (3 - Td or Tdap) 07/16/2031    Hepatitis C screen  Completed    Hepatitis A vaccine  Aged Out    Hib vaccine  Aged Out    Meningococcal (ACWY) vaccine  Aged Out         Review of Systems    Objective:     BP (!) 120/52   Pulse 73   Ht 5' 8.53\" (1.741 m)   Wt 159 lb 12.8 oz (72.5 kg)   SpO2 96%   BMI 23.92 kg/m²     Physical Exam  Vitals and nursing note reviewed. Constitutional:       Appearance: Normal appearance. He is well-developed. HENT:      Head: Normocephalic and atraumatic. Right Ear: External ear normal.      Left Ear: External ear normal.   Eyes:      Extraocular Movements: Extraocular movements intact.       Conjunctiva/sclera: Conjunctivae normal.   Neck: Thyroid: No thyromegaly. Vascular: No JVD. Cardiovascular:      Rate and Rhythm: Normal rate and regular rhythm. Pulses: Normal pulses. Heart sounds: Normal heart sounds. No murmur heard. No friction rub. No gallop. Pulmonary:      Effort: Pulmonary effort is normal. No respiratory distress. Breath sounds: Normal breath sounds. Abdominal:      Palpations: Abdomen is soft. There is no mass. Tenderness: There is no abdominal tenderness. Musculoskeletal:      Cervical back: Normal range of motion and neck supple. Right lower leg: No edema. Left lower leg: No edema. Lymphadenopathy:      Cervical: No cervical adenopathy. Skin:     General: Skin is warm. Capillary Refill: Capillary refill takes less than 2 seconds. Neurological:      General: No focal deficit present. Mental Status: He is alert and oriented to person, place, and time. Psychiatric:         Mood and Affect: Mood normal.         Behavior: Behavior normal.         Thought Content: Thought content normal.         Judgment: Judgment normal.             Multiple labs and other testing may have been ordered which may not be completely evident from the above note due to system interface incompatibilities. Patient given educational materials - see patientinstructions. Discussed use, benefit, and side effects of prescribed medications. All patient questions answered. Pt voiced understanding. Reviewed health maintenance. Instructed to continue current medications, diet andexercise. Patient agreed with treatment plan. Follow up as directed.      (Please note that portions of this note were completed with a voice-recognition program. Efforts were made to edit the dictation but occasionally words are mis-transcribed.)    Electronically signed by Grisel Badillo MD on 1/28/2023

## 2024-01-26 ENCOUNTER — OFFICE VISIT (OUTPATIENT)
Dept: FAMILY MEDICINE CLINIC | Age: 74
End: 2024-01-26

## 2024-01-26 VITALS
WEIGHT: 161 LBS | HEART RATE: 86 BPM | BODY MASS INDEX: 23.85 KG/M2 | DIASTOLIC BLOOD PRESSURE: 74 MMHG | SYSTOLIC BLOOD PRESSURE: 126 MMHG | HEIGHT: 69 IN | OXYGEN SATURATION: 97 %

## 2024-01-26 DIAGNOSIS — Z12.5 SCREENING FOR PROSTATE CANCER: ICD-10-CM

## 2024-01-26 DIAGNOSIS — Z12.11 SCREEN FOR COLON CANCER: ICD-10-CM

## 2024-01-26 DIAGNOSIS — Z13.220 SCREENING, LIPID: ICD-10-CM

## 2024-01-26 DIAGNOSIS — R25.2 LEG CRAMPS: ICD-10-CM

## 2024-01-26 DIAGNOSIS — Z00.00 WELL ADULT EXAM: Primary | ICD-10-CM

## 2024-01-26 DIAGNOSIS — R79.89 ELEVATED SERUM CREATININE: ICD-10-CM

## 2024-01-26 LAB
ALBUMIN/GLOBULIN RATIO: 1.4 G/DL
ALBUMIN: 4.2 G/DL (ref 3.5–5)
ALP BLD-CCNC: 63 UNITS/L (ref 38–126)
ALT SERPL-CCNC: 23 UNITS/L (ref 4–50)
ANION GAP SERPL CALCULATED.3IONS-SCNC: 5 MMOL/L (ref 12–16)
AST SERPL-CCNC: 34 UNITS/L (ref 17–59)
BASOPHILS %: 1.17 (ref 0–3)
BASOPHILS ABSOLUTE: 0.09 (ref 0–0.3)
BILIRUB SERPL-MCNC: 0.8 MG/DL (ref 0.2–1.3)
BUN BLDV-MCNC: 27 MG/DL (ref 9–20)
CALCIUM SERPL-MCNC: 9.4 MG/DL (ref 8.4–10.2)
CHLORIDE BLD-SCNC: 109 MMOL/L (ref 98–120)
CHOLESTEROL/HDL RATIO: 3 RATIO (ref 0–4.5)
CHOLESTEROL: 181 MG/DL (ref 50–200)
CO2: 23 MMOL/L (ref 22–31)
CREAT SERPL-MCNC: 2.2 MG/DL (ref 0.7–1.3)
DIAGNOSTIC PSA: 2.1 NG/ML (ref 0–4)
EOSINOPHILS %: 0.31 (ref 0–10)
EOSINOPHILS ABSOLUTE: 0.02 (ref 0–1.1)
FERRITIN: 57.9 NG/DL (ref 16–323)
GFR CALCULATED: 31.4
GLOBULIN: 2.9 G/DL
GLUCOSE: 81 MG/DL (ref 75–110)
HCT VFR BLD CALC: 50.7 % (ref 42–52)
HDLC SERPL-MCNC: 62 MG/DL (ref 36–68)
HEMOGLOBIN: 16.7 (ref 13.8–17.8)
LDL CHOLESTEROL CALCULATED: 103.6 MG/DL (ref 0–160)
LYMPHOCYTE %: 16.2 (ref 20–51.1)
LYMPHOCYTES ABSOLUTE: 1.19 (ref 1–5.5)
MAGNESIUM: 1.9 MG/DL (ref 1.6–2.3)
MCH RBC QN AUTO: 30.7 PG (ref 28.5–32.5)
MCHC RBC AUTO-ENTMCNC: 32.9 G/DL (ref 32–37)
MCV RBC AUTO: 93.2 FL (ref 80–94)
MONOCYTES %: 7.54 (ref 1.7–9.3)
MONOCYTES ABSOLUTE: 0.55 (ref 0.1–1)
NEUTROPHILS %: 74.78 (ref 42.2–75.2)
NEUTROPHILS ABSOLUTE: 5.51 (ref 2–8.1)
PDW BLD-RTO: 11.4 % (ref 10–15.5)
PLATELET # BLD: 275.2 THOU/MM3 (ref 130–400)
POTASSIUM SERPL-SCNC: 4.7 MMOL/L (ref 3.6–5)
RBC: 5.44 M/UL (ref 4.7–6.1)
SODIUM BLD-SCNC: 137 MMOL/L (ref 135–145)
TOTAL PROTEIN, SERUM: 7.1 G/DL (ref 6.3–8.2)
TRIGL SERPL-MCNC: 77 MG/DL (ref 10–250)
VITAMIN B-12: 692 PG/ML (ref 239–931)
VLDLC SERPL CALC-MCNC: 15 MG/DL (ref 0–50)
WBC: 7.4 THOU/ML3 (ref 4.8–10.8)

## 2024-01-26 SDOH — ECONOMIC STABILITY: FOOD INSECURITY: WITHIN THE PAST 12 MONTHS, THE FOOD YOU BOUGHT JUST DIDN'T LAST AND YOU DIDN'T HAVE MONEY TO GET MORE.: NEVER TRUE

## 2024-01-26 SDOH — ECONOMIC STABILITY: INCOME INSECURITY: HOW HARD IS IT FOR YOU TO PAY FOR THE VERY BASICS LIKE FOOD, HOUSING, MEDICAL CARE, AND HEATING?: NOT HARD AT ALL

## 2024-01-26 SDOH — ECONOMIC STABILITY: FOOD INSECURITY: WITHIN THE PAST 12 MONTHS, YOU WORRIED THAT YOUR FOOD WOULD RUN OUT BEFORE YOU GOT MONEY TO BUY MORE.: NEVER TRUE

## 2024-01-26 SDOH — ECONOMIC STABILITY: HOUSING INSECURITY
IN THE LAST 12 MONTHS, WAS THERE A TIME WHEN YOU DID NOT HAVE A STEADY PLACE TO SLEEP OR SLEPT IN A SHELTER (INCLUDING NOW)?: NO

## 2024-01-26 ASSESSMENT — PATIENT HEALTH QUESTIONNAIRE - PHQ9
2. FEELING DOWN, DEPRESSED OR HOPELESS: 0
SUM OF ALL RESPONSES TO PHQ QUESTIONS 1-9: 0
SUM OF ALL RESPONSES TO PHQ QUESTIONS 1-9: 0
1. LITTLE INTEREST OR PLEASURE IN DOING THINGS: 0
SUM OF ALL RESPONSES TO PHQ QUESTIONS 1-9: 0
SUM OF ALL RESPONSES TO PHQ9 QUESTIONS 1 & 2: 0
SUM OF ALL RESPONSES TO PHQ QUESTIONS 1-9: 0

## 2024-01-26 NOTE — PROGRESS NOTES
(goal 120/80)    Wt Readings from Last 3 Encounters:   01/26/24 73 kg (161 lb)   01/20/23 72.5 kg (159 lb 12.8 oz)   07/22/22 68 kg (150 lb)        Past Medical History:   Diagnosis Date    Non-recurrent unilateral inguinal hernia without obstruction or gangrene       Past Surgical History:   Procedure Laterality Date    INGUINAL HERNIA REPAIR         Family History   Problem Relation Age of Onset    Cancer Mother         Leukemia- 1968    Stroke Father 70        tobacco    No Known Problems Son     No Known Problems Daughter        Social History     Tobacco Use    Smoking status: Never    Smokeless tobacco: Never   Substance Use Topics    Alcohol use: Never      Current Outpatient Medications   Medication Sig Dispense Refill    Lactobacillus Rhamnosus, GG, (RA PROBIOTIC DIGESTIVE CARE) CAPS Take 1 capsule by mouth      Cholecalciferol 50 MCG (2000 UT) TABS Take 1 tablet by mouth      GLUCOSAMINE-CHONDROIT-BIOFL-MN PO Take by mouth      Omega-3 Fatty Acids (FISH OIL) 1000 MG CAPS Take 3 capsules by mouth 3 times daily      Turmeric (QC TUMERIC COMPLEX PO) Take by mouth      Padmini Root POWD by Does not apply route       No current facility-administered medications for this visit.     No Known Allergies    Health Maintenance   Topic Date Due    COVID-19 Vaccine (1) Never done    Shingles vaccine (1 of 2) Never done    Respiratory Syncytial Virus (RSV) Pregnant or age 60 yrs+ (1 - 1-dose 60+ series) Never done    Pneumococcal 65+ years Vaccine (1 - PCV) Never done    Flu vaccine (1) Never done    Colorectal Cancer Screen  07/22/2024    Depression Screen  01/26/2025    GFR test (Diabetes, CKD 3-4, OR last GFR 15-59)  01/26/2025    Lipids  01/26/2029    DTaP/Tdap/Td vaccine (3 - Td or Tdap) 07/16/2031    Hepatitis C screen  Completed    Hepatitis A vaccine  Aged Out    Hepatitis B vaccine  Aged Out    Hib vaccine  Aged Out    Polio vaccine  Aged Out    Meningococcal (ACWY) vaccine  Aged Out         Review of

## 2024-03-01 LAB
ANION GAP SERPL CALCULATED.3IONS-SCNC: 1.4 MMOL/L (ref 3–11)
BUN BLDV-MCNC: 19 MG/DL (ref 9–20)
CALCIUM SERPL-MCNC: 9.5 MG/DL (ref 8.4–10.2)
CHLORIDE BLD-SCNC: 106 MMOL/L (ref 98–120)
CO2: 30 MMOL/L (ref 22–31)
CREAT SERPL-MCNC: 0.8 MG/DL (ref 0.7–1.3)
GFR CALCULATED: > 60
GLUCOSE: 105 MG/DL (ref 75–110)
POTASSIUM SERPL-SCNC: 4.8 MMOL/L (ref 3.6–5)
SODIUM BLD-SCNC: 137 MMOL/L (ref 135–145)

## 2024-05-10 ENCOUNTER — OFFICE VISIT (OUTPATIENT)
Dept: FAMILY MEDICINE CLINIC | Age: 74
End: 2024-05-10
Payer: COMMERCIAL

## 2024-05-10 VITALS
BODY MASS INDEX: 24.25 KG/M2 | OXYGEN SATURATION: 96 % | DIASTOLIC BLOOD PRESSURE: 82 MMHG | SYSTOLIC BLOOD PRESSURE: 122 MMHG | WEIGHT: 162 LBS | HEART RATE: 77 BPM

## 2024-05-10 DIAGNOSIS — R94.31 ABNORMAL ELECTROCARDIOGRAPHY: ICD-10-CM

## 2024-05-10 DIAGNOSIS — I48.19 PERSISTENT ATRIAL FIBRILLATION (HCC): Primary | ICD-10-CM

## 2024-05-10 DIAGNOSIS — I48.20 CHRONIC ATRIAL FIBRILLATION (HCC): ICD-10-CM

## 2024-05-10 DIAGNOSIS — R94.31 ABNORMAL EKG: ICD-10-CM

## 2024-05-10 PROBLEM — B35.1 TINEA UNGUIUM: Status: RESOLVED | Noted: 2022-11-30 | Resolved: 2024-05-10

## 2024-05-10 PROBLEM — E78.00 PURE HYPERCHOLESTEROLEMIA: Status: RESOLVED | Noted: 2018-01-24 | Resolved: 2024-05-10

## 2024-05-10 PROCEDURE — 99214 OFFICE O/P EST MOD 30 MIN: CPT | Performed by: FAMILY MEDICINE

## 2024-05-10 PROCEDURE — 1123F ACP DISCUSS/DSCN MKR DOCD: CPT | Performed by: FAMILY MEDICINE

## 2024-05-10 PROCEDURE — 93000 ELECTROCARDIOGRAM COMPLETE: CPT | Performed by: FAMILY MEDICINE

## 2024-05-10 NOTE — PROGRESS NOTES
Conjunctiva/sclera: Conjunctivae normal.   Neck:      Thyroid: No thyromegaly.      Vascular: No JVD.   Cardiovascular:      Rate and Rhythm: Normal rate. Rhythm irregular.      Pulses: Normal pulses.      Heart sounds: Normal heart sounds. No murmur heard.     No friction rub. No gallop.      Comments: Rhythm is regular to auscultation but clearly demonstrates atrial fibrillation on EKG.  Pulmonary:      Effort: Pulmonary effort is normal. No respiratory distress.      Breath sounds: Normal breath sounds.   Musculoskeletal:      Cervical back: Normal range of motion and neck supple.      Right lower leg: No edema.      Left lower leg: No edema.   Lymphadenopathy:      Cervical: No cervical adenopathy.   Skin:     General: Skin is warm.      Capillary Refill: Capillary refill takes less than 2 seconds.   Neurological:      General: No focal deficit present.      Mental Status: He is alert and oriented to person, place, and time.   Psychiatric:         Mood and Affect: Mood normal.         Behavior: Behavior normal.         Thought Content: Thought content normal.         Judgment: Judgment normal.               Multiple labs and other testing may have been ordered which may not be completely evident from the above note due to system interface incompatibilities.     Patient given educational materials - see patientinstructions.  Discussed use, benefit, and side effects of prescribed medications.  All patient questions answered.  Pt voiced understanding. Reviewed health maintenance.  Instructed to continue current medications, diet andexercise.  Patient agreed with treatment plan. Follow up as directed.     (Please note that portions of this note were completed with a voice-recognition program. Efforts were made to edit the dictation but occasionally words are mis-transcribed.)    Electronically signed by Patsy Comer MD on 5/14/2024

## 2024-05-13 LAB
BASOPHILS ABSOLUTE: 0.08 (ref 0–0.3)
BASOPHILS RELATIVE PERCENT: 1.14 (ref 0–3)
EOSINOPHILS ABSOLUTE: 0.04 (ref 0–1.1)
EOSINOPHILS RELATIVE PERCENT: 0.62 (ref 0–10)
HCT VFR BLD CALC: 46.5 % (ref 42–52)
HEMOGLOBIN: 15.9 (ref 13.8–17.8)
LYMPHOCYTES ABSOLUTE: 1.67 (ref 1–5.5)
LYMPHOCYTES RELATIVE PERCENT: 24.27 (ref 20–51.1)
MAGNESIUM: 2 MG/DL (ref 1.6–2.3)
MCH RBC QN AUTO: 31.6 PG (ref 28.5–32.5)
MCHC RBC AUTO-ENTMCNC: 34.1 G/DL (ref 32–37)
MCV RBC AUTO: 92.8 FL (ref 80–94)
MONOCYTES ABSOLUTE: 0.54 (ref 0.1–1)
MONOCYTES RELATIVE PERCENT: 7.93 (ref 1.7–9.3)
NEUTROPHILS ABSOLUTE: 4.53 (ref 2–8.1)
NEUTROPHILS RELATIVE PERCENT: 66.05 (ref 42.2–75.2)
PDW BLD-RTO: 11.5 % (ref 10–15.5)
PLATELET # BLD: 249.9 THOU/MM3 (ref 130–400)
RBC # BLD: 5.02 M/UL (ref 4.7–6.1)
TSH REFLEX FT4: 1.91 MIU/ML (ref 0.49–4.67)
WBC # BLD: 6.9 THOU/ML3 (ref 4.8–10.8)

## 2024-05-23 DIAGNOSIS — I48.20 CHRONIC ATRIAL FIBRILLATION (HCC): ICD-10-CM

## 2024-05-31 ENCOUNTER — TELEPHONE (OUTPATIENT)
Dept: FAMILY MEDICINE CLINIC | Age: 74
End: 2024-05-31

## 2024-05-31 NOTE — TELEPHONE ENCOUNTER
Stress test does show an area of anterior ischemia.  Could be concerning for a blockage.  Results reviewed with patient.  He has an appointment with cardiology already established for next Friday.  If any new or unusual symptoms in the interim he will let us know immediately.  No changes to medications in the interim.

## 2024-06-08 ENCOUNTER — TELEPHONE (OUTPATIENT)
Dept: FAMILY MEDICINE CLINIC | Age: 74
End: 2024-06-08

## 2024-06-08 NOTE — TELEPHONE ENCOUNTER
Patient saw Dr. Apodaca- [facility] cardiology. Based on his note they apparantly did not have the stress test results at the time of the visit.  The cardiolyte scan showed intermediate risk scan due to area of moderate anterior ischemia.  Want to be sure they are aware of this and that their recommendations do not change in light of that.  Please also fac them the results.

## 2024-06-10 DIAGNOSIS — I48.19 PERSISTENT ATRIAL FIBRILLATION (HCC): ICD-10-CM

## 2024-06-10 DIAGNOSIS — R94.31 ABNORMAL ELECTROCARDIOGRAPHY: ICD-10-CM

## 2024-06-10 DIAGNOSIS — R94.31 ABNORMAL EKG: ICD-10-CM

## 2024-06-17 ENCOUNTER — PATIENT MESSAGE (OUTPATIENT)
Dept: FAMILY MEDICINE CLINIC | Age: 74
End: 2024-06-17

## 2024-06-17 DIAGNOSIS — I48.19 PERSISTENT ATRIAL FIBRILLATION (HCC): ICD-10-CM

## 2024-06-18 DIAGNOSIS — I48.19 PERSISTENT ATRIAL FIBRILLATION (HCC): ICD-10-CM

## 2024-06-18 NOTE — TELEPHONE ENCOUNTER
From: Wolfgang Naylor  To: Dr. Patsy Comer  Sent: 6/17/2024 7:38 PM EDT  Subject: Changing direction of treatment    Dr. Comer,    I have spent the past month thinking about the recent events related to my heart. The initial event was related to a very stressful event that triggered my A fib. 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 I feel that these events are the major contributing factors to the results of all the tests we have run. I am asking for your consent to postpone all the upcoming medical procedures. I feel certain that when my life returns to a more even keel and the stress and healing rib are over, I will be happy to re run the tests and proceed with treatment as needed. The Echocardiogram was done with me lying on my left side in pain on my cracked rib. I am fine with continuing the Blood thinner as you prescribed.   Speaking of my Eliquis prescription, I am out and need a refill.   I think my appointment for follow-up on my initial EKG is a bit premature due to the continued stress I am dealing with. Can we reschedule this EKG follow-up for the beginning of August??    I am also canceling my Heart catheterization with . Dr. Gee Yan MD.     I apologize for any confusion I have created, however I can not proceed at this time.    Respectfully,    Dr. Viktor Naylor

## 2024-06-19 NOTE — TELEPHONE ENCOUNTER
Wolfgang Naylor is requesting a refill on the following medication(s):  Requested Prescriptions     Pending Prescriptions Disp Refills    apixaban (ELIQUIS) 5 MG TABS tablet 60 tablet 5     Sig: Take 1 tablet by mouth 2 times daily       Last Visit Date (If Applicable):  5/10/2024    Next Visit Date:    8/2/2024

## 2024-08-02 ENCOUNTER — OFFICE VISIT (OUTPATIENT)
Dept: FAMILY MEDICINE CLINIC | Age: 74
End: 2024-08-02
Payer: COMMERCIAL

## 2024-08-02 VITALS
SYSTOLIC BLOOD PRESSURE: 158 MMHG | DIASTOLIC BLOOD PRESSURE: 98 MMHG | WEIGHT: 160 LBS | OXYGEN SATURATION: 99 % | HEART RATE: 71 BPM | BODY MASS INDEX: 23.95 KG/M2

## 2024-08-02 DIAGNOSIS — D68.69 SECONDARY HYPERCOAGULABLE STATE (HCC): ICD-10-CM

## 2024-08-02 DIAGNOSIS — I48.19 PERSISTENT ATRIAL FIBRILLATION (HCC): Primary | ICD-10-CM

## 2024-08-02 DIAGNOSIS — G47.33 OBSTRUCTIVE SLEEP APNEA SYNDROME: ICD-10-CM

## 2024-08-02 PROCEDURE — 99214 OFFICE O/P EST MOD 30 MIN: CPT | Performed by: FAMILY MEDICINE

## 2024-08-02 PROCEDURE — 1123F ACP DISCUSS/DSCN MKR DOCD: CPT | Performed by: FAMILY MEDICINE

## 2024-08-02 ASSESSMENT — SLEEP AND FATIGUE QUESTIONNAIRES
HOW LIKELY ARE YOU TO NOD OFF OR FALL ASLEEP WHILE WATCHING TV: SLIGHT CHANCE OF DOZING
HOW LIKELY ARE YOU TO NOD OFF OR FALL ASLEEP WHILE SITTING AND TALKING TO SOMEONE: MODERATE CHANCE OF DOZING
ESS TOTAL SCORE: 19
HOW LIKELY ARE YOU TO NOD OFF OR FALL ASLEEP WHILE SITTING QUIETLY AFTER LUNCH WITHOUT ALCOHOL: HIGH CHANCE OF DOZING
HOW LIKELY ARE YOU TO NOD OFF OR FALL ASLEEP WHILE SITTING AND READING: MODERATE CHANCE OF DOZING
HOW LIKELY ARE YOU TO NOD OFF OR FALL ASLEEP IN A CAR, WHILE STOPPED FOR A FEW MINUTES IN TRAFFIC: MODERATE CHANCE OF DOZING
HOW LIKELY ARE YOU TO NOD OFF OR FALL ASLEEP WHEN YOU ARE A PASSENGER IN A CAR FOR AN HOUR WITHOUT A BREAK: HIGH CHANCE OF DOZING
HOW LIKELY ARE YOU TO NOD OFF OR FALL ASLEEP WHILE LYING DOWN TO REST IN THE AFTERNOON WHEN CIRCUMSTANCES PERMIT: HIGH CHANCE OF DOZING
HOW LIKELY ARE YOU TO NOD OFF OR FALL ASLEEP WHILE SITTING INACTIVE IN A PUBLIC PLACE: HIGH CHANCE OF DOZING

## 2024-08-02 NOTE — PROGRESS NOTES
side effects of prescribed medications.  All patient questions answered.  Pt voiced understanding. Reviewed health maintenance.  Instructed to continue current medications, diet andexercise.  Patient agreed with treatment plan. Follow up as directed.     (Please note that portions of this note were completed with a voice-recognition program. Efforts were made to edit the dictation but occasionally words are mis-transcribed.)    Electronically signed by Patsy Comer MD on 8/10/2024

## 2024-08-10 PROBLEM — D68.69 SECONDARY HYPERCOAGULABLE STATE (HCC): Status: ACTIVE | Noted: 2024-08-10

## 2024-09-15 LAB — NONINV COLON CA DNA+OCC BLD SCRN STL QL: NEGATIVE

## 2024-10-09 DIAGNOSIS — I48.19 PERSISTENT ATRIAL FIBRILLATION (HCC): ICD-10-CM

## 2024-10-09 NOTE — TELEPHONE ENCOUNTER
Wolfgang Naylor is calling to request a refill on the following medication(s):  Requested Prescriptions     Pending Prescriptions Disp Refills    apixaban (ELIQUIS) 5 MG TABS tablet 60 tablet 5     Sig: Take 1 tablet by mouth 2 times daily       Last Visit Date (If Applicable):  8/2/2024    Next Visit Date:    1/31/2025

## 2024-11-07 ENCOUNTER — TELEPHONE (OUTPATIENT)
Dept: SLEEP CENTER | Age: 74
End: 2024-11-07

## 2024-11-07 NOTE — TELEPHONE ENCOUNTER
8/13/24 called pt left message to call back about sleep study.Pts insurance terminated. 9/11/24 pts insurance has not changed. Called pt left message to call us back. 10/2/24 called pt left message to call us back. Pt has not attempted to reach out to us, if pt wants to do in the future then patient will need an updated H&P and new orders.

## 2024-12-13 ENCOUNTER — HOSPITAL ENCOUNTER (OUTPATIENT)
Age: 74
Setting detail: OUTPATIENT SURGERY
Discharge: HOME | End: 2024-12-13
Attending: STUDENT IN AN ORGANIZED HEALTH CARE EDUCATION/TRAINING PROGRAM | Admitting: STUDENT IN AN ORGANIZED HEALTH CARE EDUCATION/TRAINING PROGRAM
Payer: COMMERCIAL

## 2024-12-13 VITALS
TEMPERATURE: 98.1 F | HEIGHT: 70 IN | OXYGEN SATURATION: 98 % | BODY MASS INDEX: 23.77 KG/M2 | WEIGHT: 166 LBS | SYSTOLIC BLOOD PRESSURE: 117 MMHG | HEART RATE: 57 BPM | RESPIRATION RATE: 15 BRPM | DIASTOLIC BLOOD PRESSURE: 66 MMHG

## 2024-12-13 DIAGNOSIS — R94.39 ABNORMAL STRESS ECG: ICD-10-CM

## 2024-12-13 LAB
BUN BLD-MCNC: 17 MG/DL (ref 8–26)
CHLORIDE BLD-SCNC: 108 MMOL/L (ref 98–107)
ECHO BSA: 1.93 M2
EGFR, POC: 79 ML/MIN/1.73M2
GLUCOSE BLD-MCNC: 104 MG/DL (ref 74–100)
HCT VFR BLD AUTO: 48 % (ref 41–53)
PLATELET # BLD AUTO: 251 K/UL (ref 138–453)
POC CREATININE: 1 MG/DL (ref 0.51–1.19)
POC HEMOGLOBIN (CALC): 16.5 G/DL (ref 13.5–17.5)
POTASSIUM BLD-SCNC: 5.1 MMOL/L (ref 3.5–4.5)
SODIUM BLD-SCNC: 144 MMOL/L (ref 138–146)

## 2024-12-13 PROCEDURE — 93458 L HRT ARTERY/VENTRICLE ANGIO: CPT | Performed by: STUDENT IN AN ORGANIZED HEALTH CARE EDUCATION/TRAINING PROGRAM

## 2024-12-13 PROCEDURE — 6360000002 HC RX W HCPCS: Performed by: STUDENT IN AN ORGANIZED HEALTH CARE EDUCATION/TRAINING PROGRAM

## 2024-12-13 PROCEDURE — 99152 MOD SED SAME PHYS/QHP 5/>YRS: CPT | Performed by: STUDENT IN AN ORGANIZED HEALTH CARE EDUCATION/TRAINING PROGRAM

## 2024-12-13 PROCEDURE — 82947 ASSAY GLUCOSE BLOOD QUANT: CPT

## 2024-12-13 PROCEDURE — 2580000003 HC RX 258: Performed by: STUDENT IN AN ORGANIZED HEALTH CARE EDUCATION/TRAINING PROGRAM

## 2024-12-13 PROCEDURE — C1894 INTRO/SHEATH, NON-LASER: HCPCS | Performed by: STUDENT IN AN ORGANIZED HEALTH CARE EDUCATION/TRAINING PROGRAM

## 2024-12-13 PROCEDURE — C1769 GUIDE WIRE: HCPCS | Performed by: STUDENT IN AN ORGANIZED HEALTH CARE EDUCATION/TRAINING PROGRAM

## 2024-12-13 PROCEDURE — 6360000004 HC RX CONTRAST MEDICATION: Performed by: STUDENT IN AN ORGANIZED HEALTH CARE EDUCATION/TRAINING PROGRAM

## 2024-12-13 PROCEDURE — 2709999900 HC NON-CHARGEABLE SUPPLY: Performed by: STUDENT IN AN ORGANIZED HEALTH CARE EDUCATION/TRAINING PROGRAM

## 2024-12-13 PROCEDURE — 85049 AUTOMATED PLATELET COUNT: CPT

## 2024-12-13 PROCEDURE — 84520 ASSAY OF UREA NITROGEN: CPT

## 2024-12-13 PROCEDURE — 84132 ASSAY OF SERUM POTASSIUM: CPT

## 2024-12-13 PROCEDURE — 82565 ASSAY OF CREATININE: CPT

## 2024-12-13 PROCEDURE — 84295 ASSAY OF SERUM SODIUM: CPT

## 2024-12-13 PROCEDURE — 7100000011 HC PHASE II RECOVERY - ADDTL 15 MIN: Performed by: STUDENT IN AN ORGANIZED HEALTH CARE EDUCATION/TRAINING PROGRAM

## 2024-12-13 PROCEDURE — 2500000003 HC RX 250 WO HCPCS: Performed by: STUDENT IN AN ORGANIZED HEALTH CARE EDUCATION/TRAINING PROGRAM

## 2024-12-13 PROCEDURE — 85014 HEMATOCRIT: CPT

## 2024-12-13 PROCEDURE — 82435 ASSAY OF BLOOD CHLORIDE: CPT

## 2024-12-13 PROCEDURE — 7100000010 HC PHASE II RECOVERY - FIRST 15 MIN: Performed by: STUDENT IN AN ORGANIZED HEALTH CARE EDUCATION/TRAINING PROGRAM

## 2024-12-13 RX ORDER — SODIUM CHLORIDE 0.9 % (FLUSH) 0.9 %
5-40 SYRINGE (ML) INJECTION PRN
Status: DISCONTINUED | OUTPATIENT
Start: 2024-12-13 | End: 2024-12-13 | Stop reason: HOSPADM

## 2024-12-13 RX ORDER — IOPAMIDOL 755 MG/ML
INJECTION, SOLUTION INTRAVASCULAR PRN
Status: DISCONTINUED | OUTPATIENT
Start: 2024-12-13 | End: 2024-12-13 | Stop reason: HOSPADM

## 2024-12-13 RX ORDER — ATORVASTATIN CALCIUM 40 MG/1
40 TABLET, FILM COATED ORAL DAILY
Qty: 30 TABLET | Refills: 3 | Status: SHIPPED | OUTPATIENT
Start: 2024-12-13

## 2024-12-13 RX ORDER — MIDAZOLAM HYDROCHLORIDE 1 MG/ML
INJECTION, SOLUTION INTRAMUSCULAR; INTRAVENOUS PRN
Status: DISCONTINUED | OUTPATIENT
Start: 2024-12-13 | End: 2024-12-13 | Stop reason: HOSPADM

## 2024-12-13 RX ORDER — SODIUM CHLORIDE 9 MG/ML
INJECTION, SOLUTION INTRAVENOUS CONTINUOUS
Status: DISCONTINUED | OUTPATIENT
Start: 2024-12-13 | End: 2024-12-13

## 2024-12-13 RX ORDER — ASPIRIN 81 MG/1
81 TABLET ORAL DAILY
Qty: 90 TABLET | Refills: 2 | Status: SHIPPED | OUTPATIENT
Start: 2024-12-13

## 2024-12-13 RX ORDER — SODIUM CHLORIDE 9 MG/ML
INJECTION, SOLUTION INTRAVENOUS PRN
Status: DISCONTINUED | OUTPATIENT
Start: 2024-12-13 | End: 2024-12-13 | Stop reason: HOSPADM

## 2024-12-13 RX ORDER — FENTANYL CITRATE 50 UG/ML
INJECTION, SOLUTION INTRAMUSCULAR; INTRAVENOUS PRN
Status: DISCONTINUED | OUTPATIENT
Start: 2024-12-13 | End: 2024-12-13 | Stop reason: HOSPADM

## 2024-12-13 RX ORDER — SODIUM CHLORIDE 0.9 % (FLUSH) 0.9 %
5-40 SYRINGE (ML) INJECTION EVERY 12 HOURS SCHEDULED
Status: DISCONTINUED | OUTPATIENT
Start: 2024-12-13 | End: 2024-12-13 | Stop reason: HOSPADM

## 2024-12-13 RX ADMIN — SODIUM CHLORIDE: 9 INJECTION, SOLUTION INTRAVENOUS at 08:12

## 2024-12-13 NOTE — PROGRESS NOTES
Received post procedure to [facility] to room 11. Assessment obtained. Restrictions reviewed with patient. Post procedure pathway initiated.  Rt wrist  site soft, vasc band dry and intact.  No hematoma noted. Wife at side.  Patient without complaints.right  arm  straight.

## 2024-12-13 NOTE — PROGRESS NOTES
All discharge instructions reviewed with wife  and patient, questions answered.  Patient discharged per w/c  with wife,Tuyet and belongings.

## 2024-12-13 NOTE — PROGRESS NOTES
Ambulated in leger with Tuyet, voided . Returned to bed. Dr Chris visited at bedside. Patient c/o numbness to rt thumb. Numbness subsided prior to discharge. Rt radial pulse palpable .No s/s of hematoma.

## 2024-12-13 NOTE — PROGRESS NOTES
Writer spoke with Analilia in Pharmacy . Analilia  in Pharmacy to transfer discharge meds to patients pharmacy in [facility]. Patient and wife informed.

## 2024-12-13 NOTE — H&P
[facility]  Pre- Procedure History and Physical/Update          Patient Name:  Wolfgang Naylor  MRN:    [id number]  YOB: 1950  Date of evaluation:  12/13/2024    Procedure:                           Cardiac Cath +/- PCI       Indication for procedure:     Abnormal stress test      Please refer to the consult note / H&P completed by Dr. Brush on 11/25/24 in the medical record and note that:       [x] I have examined the patient and reviewed the H&P/Consult and there are no changes to be made to the assessment or plan.    [] I have examined the patient and reviewed the H&P/Consult and have noted the following changes:        Past Medical History:   Diagnosis Date    Non-recurrent unilateral inguinal hernia without obstruction or gangrene        Past Surgical History:   Procedure Laterality Date    DIAGNOSTIC CARDIAC CATH LAB PROCEDURE  12/13/2024    INGUINAL HERNIA REPAIR          reports that he has never smoked. He has never used smokeless tobacco. He reports that he does not drink alcohol and does not use drugs.    Prior to Admission medications    Medication Sig Start Date End Date Taking? Authorizing Provider   apixaban (ELIQUIS) 5 MG TABS tablet Take 1 tablet by mouth 2 times daily 10/9/24  Yes Patsy Comer MD   Lactobacillus Rhamnosus, GG, (RA PROBIOTIC DIGESTIVE CARE) CAPS Take 1 capsule by mouth   Yes Hernando Blackwell MD   GLUCOSAMINE-CHONDROIT-BIOFL-MN PO Take by mouth   Yes Hernando Blackwell MD   Omega-3 Fatty Acids (FISH OIL) 1000 MG CAPS Take 3 capsules by mouth 3 times daily   Yes Hernando Blackwell MD   Turmeric (QC TUMERIC COMPLEX PO) Take by mouth   Yes Hernando Blackwell MD   Padmini Root POWD by Does not apply route   Yes Hernando Blackwell MD       No Known Allergies      REVIEW OF SYSTEMS:     A detailed review of system was performed as already noted and is otherwise as above.       PHYSICAL EXAM:     Vitals:    12/13/24 0800   BP: 138/88   Pulse:  77   Resp: 21   Temp: 98.1 °F (36.7 °C)   SpO2: 98%        Constitutional and General Appearance: Alert. Not in acute stress.   Respiratory:  Clear to auscultation b/l. No wheeze or crackles.   Cardiovascular:  Regular rate and rhythm. No murmurs.   Jugular venous pulsation is normal  Abdomen:  No masses or tenderness  Extremities:  Lower extremity edema - No  Skin: Warm and dry  Neurological:  Alert and oriented.  Moves all extremities well        Assessment:  Abnormal stress test with moderate anterior ischemia  Atrial fibrillation on anticoagulation at home      Plan:  Proceed with planned LHC +/- PCI  Further orders to follow.     Pre Procedure Conscious Sedation Data:  ASA Class:                  [] I [x] II [] III [] IV     Mallampati Class:       [] I [x] II [] III [] IV      The risks, benefits, and alternatives of the prcoedure were discussed in detail with the patient/family. The patient/family agrees to proceed with the procedure, verbalizes understanding and gave consent.       Oliver Liang MD  Fellow, Cardiovascular Diseases    [facility]

## 2024-12-13 NOTE — DISCHARGE INSTRUCTIONS
DISCHARGE INSTRUCTIONS / ARM CARE POST CATHERIZATION        ENCOURAGE FLUIDS    NO STRENUOUS LIFTING WITH AFFECTED ARM FOR 3 DAYS ANYTHING HEAVIER THAN 8 TO 10 POUNDS    REMOVE BAND-AID/PRESSURE DRESSING THE FOLLOWING DAY AND DO NOT APPLY ANY FURTHER BAND-AIDS    KEEP INCISION CLEAN DRY AND OPEN TO THE AIR / NO HAND LOTION NEAR PUNCTURE SITE    WATCH FOR SIGNS OF INFECTION /  REDNESS / SWELLING / DRAINAGE / WARMTH / TEMPERATURE GREATER THAN 101    IF BLEEDING OCCURS HOLD MANUAL PRESSURE DIRECTLY OVER SITE  (YOU WILL FEEL PULSATION OF ARTERY) AND IF BLEEDING DOES NOT STOP AFTER 2 MINUTES CALL 911    OK TO SHOWER THE NEXT DAY, NO TUB BATHING OR HOT TUBS/SWIMMING FOR 7 DAYS    IF AREA BECOMES HARD AND SWOLLEN AND IF YOU ARE AT ALL CONCERNED SEEK HELP IMMEDIATELY    SEEK HELP IMMEDIATELY IF AFFECTED ARM BECOMES COLD / NUMB / SEVERE PAIN / NAILBEDS TURN BLUE     IF ON METFORMIN / GLUCOPHAGE DO NOT RESTART MEDICATION FOR 48 HOURS    PLEASE PRACTICE GOOD HAND WASHING AND INCLUDE PUNCTURE SITE ESPECIALLY AFTER USING THE RESTROOM    AVOID USING ALCOHOL BASED HAND SANITIZERS FOR ONE WEEK      CALL 911 if you have symptoms including:   Drooping facial muscles   Changes in vision or speech   Difficulty walking or using your limbs   Change in sensation to affected leg, including numbness, feeling cold, or change in color   Extreme sweating, nausea or vomiting   Dizziness or lightheadedness   Chest pain   Rapid, irregular heartbeat   Palpitations   Cough, shortness of breath, or difficulty breathing   Weakness or fainting   If you think you have an emergency, CALL 911 .        SEDATION / ANALGESIA INFORMATION / HOME GOING ADVICE  You have received the sedation/analgesia medication during your visit    Sedation/analgesia is used during short medical procedures under controlled supervision. The medication will produce a strong relaxation. You will be able to hear, speak and follow instructions, but your memory and alertness will be  decreased.    You will be able to swallow and breathe on your own. During sedation/analgesia your blood pressure, heart and breathing will be watched closely. After the procedure, you may not remember what was said or done.    You may have the following effects from the medication.  \" Drowsiness, dizziness, sleepiness or confusion.  \" Difficulty remembering or delayed reaction times.  \" Loss of fine muscle control or difficulty with your balance especially while walking.  \" Difficulty focusing or blurred vision.  You may not be aware of slight changes in your behavior and/or your reaction time because of the medication used during the procedure. Therefore you should follow these instructions.  \" Have someone responsible help you with your care.  \" Do not drive for 24 hours.  \" Do not operate equipment for 24 hours (lawnmowers, power tools, kitchen accessories, stove).  \" Do not drink any alcoholic beverages for a minimum of 24 hours.  \" Do not make important personal, legal or business decisions for 24 hours.  \" You may experience dizziness or lightheadedness. Move slowly and carefully, do not make sudden position changes.  \" Drink extra amounts of fluids today.  \" Increase your diet as tolerated (unless you have received specific instructions from your doctor).  \" If you feel nauseated, continue with liquids until the nausea is gone.  \" Notify your physician if you have not urinated within 8 hours after the procedure.  \" Resume your medications unless otherwise instructed.         Coronary artery disease (CAD) occurs when plaque builds up in the arteries that bring oxygen-rich blood to your heart. Plaque is a fatty substance made of cholesterol, calcium, and other substances in the blood. This process is called hardening of the arteries, or atherosclerosis.  What happens when you have coronary artery disease?  Plaque may narrow the coronary arteries. Narrowed arteries cause poor blood flow. This can lead to angina

## 2024-12-13 NOTE — PROGRESS NOTES
Patient admitted, consent signed and questions answered. Patient ready for procedure. Call light to reach with side rails up 2 of 2. Bilat  clipped with writer and Alexsandra present.  wife at bedside with patient.

## 2025-02-06 ASSESSMENT — PATIENT HEALTH QUESTIONNAIRE - PHQ9
SUM OF ALL RESPONSES TO PHQ QUESTIONS 1-9: 0
SUM OF ALL RESPONSES TO PHQ QUESTIONS 1-9: 0
1. LITTLE INTEREST OR PLEASURE IN DOING THINGS: NOT AT ALL
1. LITTLE INTEREST OR PLEASURE IN DOING THINGS: NOT AT ALL
2. FEELING DOWN, DEPRESSED OR HOPELESS: NOT AT ALL
SUM OF ALL RESPONSES TO PHQ9 QUESTIONS 1 & 2: 0
SUM OF ALL RESPONSES TO PHQ QUESTIONS 1-9: 0
SUM OF ALL RESPONSES TO PHQ QUESTIONS 1-9: 0
SUM OF ALL RESPONSES TO PHQ9 QUESTIONS 1 & 2: 0
2. FEELING DOWN, DEPRESSED OR HOPELESS: NOT AT ALL

## 2025-02-07 ENCOUNTER — OFFICE VISIT (OUTPATIENT)
Dept: FAMILY MEDICINE CLINIC | Age: 75
End: 2025-02-07
Payer: COMMERCIAL

## 2025-02-07 VITALS
HEART RATE: 82 BPM | BODY MASS INDEX: 23.53 KG/M2 | DIASTOLIC BLOOD PRESSURE: 76 MMHG | SYSTOLIC BLOOD PRESSURE: 132 MMHG | OXYGEN SATURATION: 99 % | WEIGHT: 164 LBS

## 2025-02-07 DIAGNOSIS — D68.69 SECONDARY HYPERCOAGULABLE STATE (HCC): ICD-10-CM

## 2025-02-07 DIAGNOSIS — Z00.00 WELL ADULT EXAM: Primary | ICD-10-CM

## 2025-02-07 DIAGNOSIS — Z13.220 ENCOUNTER FOR LIPID SCREENING FOR CARDIOVASCULAR DISEASE: ICD-10-CM

## 2025-02-07 DIAGNOSIS — Z11.4 SCREENING FOR HIV (HUMAN IMMUNODEFICIENCY VIRUS): ICD-10-CM

## 2025-02-07 DIAGNOSIS — Z13.6 ENCOUNTER FOR LIPID SCREENING FOR CARDIOVASCULAR DISEASE: ICD-10-CM

## 2025-02-07 DIAGNOSIS — I48.19 PERSISTENT ATRIAL FIBRILLATION (HCC): ICD-10-CM

## 2025-02-07 PROCEDURE — 99397 PER PM REEVAL EST PAT 65+ YR: CPT | Performed by: FAMILY MEDICINE

## 2025-02-07 SDOH — ECONOMIC STABILITY: FOOD INSECURITY: WITHIN THE PAST 12 MONTHS, YOU WORRIED THAT YOUR FOOD WOULD RUN OUT BEFORE YOU GOT MONEY TO BUY MORE.: NEVER TRUE

## 2025-02-07 SDOH — ECONOMIC STABILITY: FOOD INSECURITY: WITHIN THE PAST 12 MONTHS, THE FOOD YOU BOUGHT JUST DIDN'T LAST AND YOU DIDN'T HAVE MONEY TO GET MORE.: NEVER TRUE

## 2025-02-07 NOTE — PROGRESS NOTES
[place] [address] [address]  [place] [address]  Dept: [phone]  Dept Fax:[phone]    Wolfgang Naylor is a 74 y.o. male who presents today for his medical conditions/complaints as notedbelow.        Assessment/Plan:     Assessment & Plan  1. Atrial fibrillation.  He is currently taking Eliquis but occasionally misses a dose. He never misses more than half a day. He reports no excessive bleeding, bruising, or other symptoms such as chest pain, shortness of breath, or heart palpitations. A comprehensive metabolic panel (CMP), complete blood count (CBC), lipid panel, and HIV screen have been ordered to monitor his condition.    2. Hyperlipidemia.  He is not currently taking statins as he prefers to manage his condition through diet and lifestyle changes. His cholesterol levels were checked last year and showed no issues. A lipid panel has been ordered to monitor his cholesterol levels.    3. Health maintenance.  He is due for his annual blood work. He does not require a PSA test as he is asymptomatic and over the age of 71. A comprehensive metabolic panel (CMP), complete blood count (CBC), lipid panel, and HIV screen have been ordered.      Assessment & Plan  Well adult exam     Secondary hypercoagulable state (HCC)     Persistent atrial fibrillation (HCC)     Orders:    CBC with Auto Differential; Future    apixaban (ELIQUIS) 5 MG TABS tablet; Take 1 tablet by mouth 2 times daily    Encounter for lipid screening for cardiovascular disease      Orders:    Comprehensive Metabolic Panel; Future    Lipid Panel; Future    Screening for HIV (human immunodeficiency virus)    Orders:    HIV Screen; Future         Results      Lab Results   Component Value Date    WBC 6.9 05/13/2024    HGB 15.9 05/13/2024    HCT 46.5 05/13/2024     12/13/2024    CHOL 181 01/26/2024    TRIG 77 01/26/2024    HDL 62 01/26/2024    ALT 23 01/26/2024    AST 34 01/26/2024

## 2025-03-03 LAB
ALBUMIN/GLOBULIN RATIO: 1.4 G/DL
ALBUMIN: 4.2 G/DL (ref 3.5–5)
ALP BLD-CCNC: 78 UNITS/L (ref 38–126)
ALT SERPL-CCNC: 28 UNITS/L (ref 4–50)
ANION GAP SERPL CALCULATED.3IONS-SCNC: 9.6 MMOL/L (ref 3–11)
AST SERPL-CCNC: 42 UNITS/L (ref 17–59)
BASOPHILS ABSOLUTE: 0.06 X10E3/?L (ref 0–0.3)
BASOPHILS RELATIVE PERCENT: 1.1 % (ref 0–3)
BILIRUB SERPL-MCNC: 0.9 MG/DL (ref 0.2–1.3)
BUN BLDV-MCNC: 14 MG/DL (ref 9–20)
CALCIUM SERPL-MCNC: 9.2 MG/DL (ref 8.4–10.2)
CHLORIDE BLD-SCNC: 108 MMOL/L (ref 98–120)
CHOLESTEROL, TOTAL: 187 MG/DL (ref 50–200)
CHOLESTEROL/HDL RATIO: 3 RATIO (ref 0–4.5)
CO2: 22 MMOL/L (ref 22–31)
CREAT SERPL-MCNC: 0.8 MG/DL (ref 0.7–1.3)
EOSINOPHILS ABSOLUTE: 0.03 X10E3/?L (ref 0–1.1)
EOSINOPHILS RELATIVE PERCENT: 0.59 % (ref 0–10)
GFR, ESTIMATED: > 60
GLOBULIN: 3 G/DL
GLUCOSE: 96 MG/DL (ref 75–110)
HCT VFR BLD CALC: 48.2 % (ref 42–52)
HDLC SERPL-MCNC: 67 MG/DL (ref 36–68)
HEMOGLOBIN: 15.7 G/DL (ref 13.8–17.8)
HIV AG/AB: NONREACTIVE
LDL CHOLESTEROL: 107.4 MG/DL (ref 0–160)
LYMPHOCYTES ABSOLUTE: 1.15 X10E3/?L (ref 1–5.5)
LYMPHOCYTES RELATIVE PERCENT: 21.82 % (ref 20–51.1)
MCH RBC QN AUTO: 30.7 PG (ref 28.5–32.5)
MCHC RBC AUTO-ENTMCNC: 32.5 G/DL (ref 32–37)
MCV RBC AUTO: 94.3 FL (ref 80–94)
MONOCYTES ABSOLUTE: 0.59 X10E3/?L (ref 0.1–1)
MONOCYTES RELATIVE PERCENT: 11.3 % (ref 1.7–9.3)
NEUTROPHILS ABSOLUTE: 3.42 X10E3/?L (ref 2–8.1)
NEUTROPHILS RELATIVE PERCENT: 65.19 % (ref 42.2–75.2)
PDW BLD-RTO: 12.2 % (ref 10–15.5)
PLATELET # BLD: 276.5 THOU/MM3 (ref 130–400)
POTASSIUM SERPL-SCNC: 4.7 MMOL/L (ref 3.6–5)
RBC # BLD: 5.11 M/UL (ref 4.7–6.1)
SODIUM BLD-SCNC: 139 MMOL/L (ref 135–145)
TOTAL PROTEIN: 7.2 G/DL (ref 6.3–8.2)
TRIGL SERPL-MCNC: 63 MG/DL (ref 10–250)
VLDLC SERPL CALC-MCNC: 13 MG/DL (ref 0–50)
WBC # BLD: 5.2 THOU/ML3 (ref 4.8–10.8)

## 2025-03-16 ENCOUNTER — RESULTS FOLLOW-UP (OUTPATIENT)
Dept: FAMILY MEDICINE CLINIC | Age: 75
End: 2025-03-16

## 2025-05-23 ENCOUNTER — TELEPHONE (OUTPATIENT)
Dept: FAMILY MEDICINE CLINIC | Age: 75
End: 2025-05-23

## 2025-05-23 NOTE — TELEPHONE ENCOUNTER
Spoke with Dr. Marinelli from [facility] ED. Patient visited a chiropractor earlier today and subsequently experienced confusion, including inability to recall the visit or recent activities such as planting flowers. Due to concern for CVA, his wife drove him to [facility].     A CT of the head and CT angiogram of head and neck were  negative. Dr. Galvin recommends a diffusion-weighted MRI to further evaluate for possible ischemic changes and would like to ensure outpatient follow up for this imaging.    Patient is also hypertensive with a systolic blood pressure of 167. Low dose lisinopril will be started.     Patient will need close follow up and should be scheduled early next week.

## 2025-05-27 DIAGNOSIS — F44.89 CONFUSION STATE: ICD-10-CM

## 2025-05-27 DIAGNOSIS — R29.90 STROKE-LIKE SYMPTOMS: ICD-10-CM

## 2025-05-27 DIAGNOSIS — I48.91 ATRIAL FIBRILLATION, UNSPECIFIED TYPE (HCC): Primary | ICD-10-CM

## 2025-05-28 ENCOUNTER — OFFICE VISIT (OUTPATIENT)
Dept: FAMILY MEDICINE CLINIC | Age: 75
End: 2025-05-28
Payer: COMMERCIAL

## 2025-05-28 VITALS
HEART RATE: 88 BPM | SYSTOLIC BLOOD PRESSURE: 132 MMHG | DIASTOLIC BLOOD PRESSURE: 74 MMHG | BODY MASS INDEX: 23.24 KG/M2 | WEIGHT: 162 LBS | OXYGEN SATURATION: 97 %

## 2025-05-28 DIAGNOSIS — G45.9 TIA (TRANSIENT ISCHEMIC ATTACK): Primary | ICD-10-CM

## 2025-05-28 PROCEDURE — 1159F MED LIST DOCD IN RCRD: CPT | Performed by: FAMILY MEDICINE

## 2025-05-28 PROCEDURE — G8420 CALC BMI NORM PARAMETERS: HCPCS | Performed by: FAMILY MEDICINE

## 2025-05-28 PROCEDURE — 1036F TOBACCO NON-USER: CPT | Performed by: FAMILY MEDICINE

## 2025-05-28 PROCEDURE — 1123F ACP DISCUSS/DSCN MKR DOCD: CPT | Performed by: FAMILY MEDICINE

## 2025-05-28 PROCEDURE — G2211 COMPLEX E/M VISIT ADD ON: HCPCS | Performed by: FAMILY MEDICINE

## 2025-05-28 PROCEDURE — 99214 OFFICE O/P EST MOD 30 MIN: CPT | Performed by: FAMILY MEDICINE

## 2025-05-28 PROCEDURE — G8427 DOCREV CUR MEDS BY ELIG CLIN: HCPCS | Performed by: FAMILY MEDICINE

## 2025-05-28 PROCEDURE — 3017F COLORECTAL CA SCREEN DOC REV: CPT | Performed by: FAMILY MEDICINE

## 2025-05-28 RX ORDER — LISINOPRIL 5 MG/1
5 TABLET ORAL DAILY
COMMUNITY
Start: 2025-05-24

## 2025-05-28 NOTE — PROGRESS NOTES
Use    Smoking status: Never    Smokeless tobacco: Never   Substance Use Topics    Alcohol use: Never      Current Outpatient Medications   Medication Sig Dispense Refill    lisinopril (PRINIVIL;ZESTRIL) 5 MG tablet Take 1 tablet by mouth daily      apixaban (ELIQUIS) 5 MG TABS tablet Take 1 tablet by mouth 2 times daily 60 tablet 5    aspirin 81 MG EC tablet Take 1 tablet by mouth daily 90 tablet 2    atorvastatin (LIPITOR) 40 MG tablet Take 1 tablet by mouth daily 30 tablet 3    Lactobacillus Rhamnosus, GG, ( PROBIOTIC DIGESTIVE CARE) CAPS Take 1 capsule by mouth      GLUCOSAMINE-CHONDROIT-BIOFL-MN PO Take by mouth      Turmeric (QC TUMERIC COMPLEX PO) Take by mouth      Padmini Root POWD by Does not apply route       No current facility-administered medications for this visit.     No Known Allergies    Health Maintenance   Topic Date Due    Shingles vaccine (1 of 2) Never done    Pneumococcal 50+ years Vaccine (1 of 1 - PCV) Never done    Respiratory Syncytial Virus (RSV) Pregnant or age 60 yrs+ (1 - Risk 60-74 years 1-dose series) Never done    COVID-19 Vaccine (1 - 2024-25 season) Never done    Flu vaccine (Season Ended) 08/01/2025    Depression Screen  02/06/2026    Lipids  03/03/2026    Colorectal Cancer Screen  09/07/2027    DTaP/Tdap/Td vaccine (3 - Td or Tdap) 07/16/2031    Hepatitis C screen  Completed    Hepatitis A vaccine  Aged Out    Hepatitis B vaccine  Aged Out    Hib vaccine  Aged Out    Polio vaccine  Aged Out    Meningococcal (ACWY) vaccine  Aged Out    Meningococcal B vaccine  Aged Out    GFR test (Diabetes, CKD 3-4, OR last GFR 15-59)  Discontinued         Review of Systems    Objective:     /74 (BP Site: Left Upper Arm, Patient Position: Sitting, BP Cuff Size: Medium Adult)   Pulse 88   Wt 73.5 kg (162 lb)   SpO2 97%   BMI 23.24 kg/m²     Physical Exam  Neurological: Awake, alert, oriented x4, no focal deficit  Head: Normocephalic, atraumatic  Eyes: Pupils equal and round,

## 2025-05-29 ENCOUNTER — TELEPHONE (OUTPATIENT)
Dept: FAMILY MEDICINE CLINIC | Age: 75
End: 2025-05-29

## 2025-05-29 NOTE — TELEPHONE ENCOUNTER
Sade asked for possibly to have a Bilateral Carotid Ultrasound of his neck.  It was suggested by a mutual physician friend.  Sade asked for a call back with a yes or a no.

## 2025-05-29 NOTE — TELEPHONE ENCOUNTER
The CTA of the head and neck showed the full length of the carotid arteries and is a more accurate test than the carotid ultrasound.  Since the CTA of the neck showed no blockages in the carotid arteries the carotid dopplers will not add any new information.

## 2025-06-05 ENCOUNTER — RESULTS FOLLOW-UP (OUTPATIENT)
Dept: FAMILY MEDICINE CLINIC | Age: 75
End: 2025-06-05

## 2025-06-23 DIAGNOSIS — I48.19 PERSISTENT ATRIAL FIBRILLATION (HCC): ICD-10-CM

## 2025-06-23 RX ORDER — LISINOPRIL 5 MG/1
5 TABLET ORAL DAILY
Qty: 30 TABLET | Refills: 1 | Status: SHIPPED | OUTPATIENT
Start: 2025-06-23

## 2025-06-23 NOTE — TELEPHONE ENCOUNTER
Wolfgang Naylor is requesting a refill on the following medication(s):  Requested Prescriptions     Pending Prescriptions Disp Refills    apixaban (ELIQUIS) 5 MG TABS tablet 60 tablet 5     Sig: Take 1 tablet by mouth 2 times daily       Last Visit Date (If Applicable):  5/28/2025      Next Visit Date:    7/25/2025

## 2025-06-23 NOTE — TELEPHONE ENCOUNTER
Wolfgang Naylor is requesting a refill on the following medication(s):  Requested Prescriptions     Pending Prescriptions Disp Refills    lisinopril (PRINIVIL;ZESTRIL) 5 MG tablet 30 tablet 1     Sig: Take 1 tablet by mouth daily       Last Visit Date (If Applicable):  5/28/2025      Next Visit Date:    7/25/2025

## 2025-07-25 ENCOUNTER — OFFICE VISIT (OUTPATIENT)
Dept: FAMILY MEDICINE CLINIC | Age: 75
End: 2025-07-25
Payer: COMMERCIAL

## 2025-07-25 VITALS
OXYGEN SATURATION: 96 % | HEART RATE: 61 BPM | DIASTOLIC BLOOD PRESSURE: 64 MMHG | WEIGHT: 160 LBS | SYSTOLIC BLOOD PRESSURE: 120 MMHG | BODY MASS INDEX: 22.96 KG/M2

## 2025-07-25 DIAGNOSIS — G45.9 TIA (TRANSIENT ISCHEMIC ATTACK): Primary | ICD-10-CM

## 2025-07-25 DIAGNOSIS — I48.20 CHRONIC ATRIAL FIBRILLATION (HCC): ICD-10-CM

## 2025-07-25 DIAGNOSIS — D68.69 SECONDARY HYPERCOAGULABLE STATE: ICD-10-CM

## 2025-07-25 DIAGNOSIS — N52.9 VASCULOGENIC ERECTILE DYSFUNCTION, UNSPECIFIED VASCULOGENIC ERECTILE DYSFUNCTION TYPE: ICD-10-CM

## 2025-07-25 PROCEDURE — 1159F MED LIST DOCD IN RCRD: CPT | Performed by: FAMILY MEDICINE

## 2025-07-25 PROCEDURE — 99214 OFFICE O/P EST MOD 30 MIN: CPT | Performed by: FAMILY MEDICINE

## 2025-07-25 PROCEDURE — 1160F RVW MEDS BY RX/DR IN RCRD: CPT | Performed by: FAMILY MEDICINE

## 2025-07-25 PROCEDURE — 1123F ACP DISCUSS/DSCN MKR DOCD: CPT | Performed by: FAMILY MEDICINE

## 2025-07-25 PROCEDURE — G8427 DOCREV CUR MEDS BY ELIG CLIN: HCPCS | Performed by: FAMILY MEDICINE

## 2025-07-25 PROCEDURE — 3017F COLORECTAL CA SCREEN DOC REV: CPT | Performed by: FAMILY MEDICINE

## 2025-07-25 PROCEDURE — G2211 COMPLEX E/M VISIT ADD ON: HCPCS | Performed by: FAMILY MEDICINE

## 2025-07-25 PROCEDURE — 1036F TOBACCO NON-USER: CPT | Performed by: FAMILY MEDICINE

## 2025-07-25 PROCEDURE — G8420 CALC BMI NORM PARAMETERS: HCPCS | Performed by: FAMILY MEDICINE

## 2025-07-25 RX ORDER — SILDENAFIL 50 MG/1
50 TABLET, FILM COATED ORAL PRN
Qty: 10 TABLET | Refills: 3 | Status: SHIPPED | OUTPATIENT
Start: 2025-07-25

## 2025-07-25 RX ORDER — ROSUVASTATIN CALCIUM 10 MG/1
5 TABLET, COATED ORAL DAILY
Qty: 30 TABLET | Refills: 5 | Status: SHIPPED | OUTPATIENT
Start: 2025-07-25

## 2025-07-25 NOTE — PROGRESS NOTES
is warm.      Capillary Refill: Capillary refill takes less than 2 seconds.   Neurological:      General: No focal deficit present.      Mental Status: He is alert and oriented to person, place, and time.   Psychiatric:         Mood and Affect: Mood normal.         Behavior: Behavior normal.         Thought Content: Thought content normal.         Judgment: Judgment normal.               Multiple labs and other testing may have been ordered which may not be completely evident from the above note due to system interface incompatibilities.     Patient given educational materials - see patientinstructions.  Discussed use, benefit, and side effects of prescribed medications.  All patient questions answered.  Pt voiced understanding. Reviewed health maintenance.  Instructed to continue current medications, diet andexercise.  Patient agreed with treatment plan. Follow up as directed.     The patient (or guardian, if applicable) and other individuals in attendance with the patient were advised that Artificial Intelligence will be utilized during this visit to record, process the conversation to generate a clinical note, and support improvement of the AI technology. The patient (or guardian, if applicable) and other individuals in attendance at the appointment consented to the use of AI, including the recording.                   Electronically signed by Patsy Comer MD on 7/27/2025

## 2025-08-19 RX ORDER — LISINOPRIL 5 MG/1
5 TABLET ORAL DAILY
Qty: 30 TABLET | Refills: 1 | Status: SHIPPED | OUTPATIENT
Start: 2025-08-19

## (undated) DEVICE — BAND COMPR L24CM REG CLR PLAS HEMSTAT EXT HK AND LOOP RETEN

## (undated) DEVICE — TRAY SURG CUST CRD CATH TOLEDO

## (undated) DEVICE — GLIDESHEATH SLENDER STAINLESS STEEL KIT: Brand: GLIDESHEATH SLENDER

## (undated) DEVICE — CATHETER ANGIO 6FR L100CM DIA0.056IN FR4 CRV VASC ACCS EXPO

## (undated) DEVICE — GUIDEWIRE 35/260/FC/PTFE/3J: Brand: GUIDEWIRE

## (undated) DEVICE — ANGIOGRAPHIC CATHETER: Brand: EXPO™